# Patient Record
Sex: MALE | Race: WHITE | Employment: OTHER | URBAN - METROPOLITAN AREA
[De-identification: names, ages, dates, MRNs, and addresses within clinical notes are randomized per-mention and may not be internally consistent; named-entity substitution may affect disease eponyms.]

---

## 2017-02-08 ENCOUNTER — MEDICAL CORRESPONDENCE (OUTPATIENT)
Dept: HEALTH INFORMATION MANAGEMENT | Facility: CLINIC | Age: 67
End: 2017-02-08

## 2017-02-20 ENCOUNTER — OFFICE VISIT (OUTPATIENT)
Dept: PHYSICAL MEDICINE AND REHAB | Facility: CLINIC | Age: 67
End: 2017-02-20

## 2017-02-20 VITALS
SYSTOLIC BLOOD PRESSURE: 152 MMHG | OXYGEN SATURATION: 96 % | TEMPERATURE: 97.3 F | DIASTOLIC BLOOD PRESSURE: 96 MMHG | HEIGHT: 69 IN | WEIGHT: 229 LBS | BODY MASS INDEX: 33.92 KG/M2 | HEART RATE: 61 BPM | RESPIRATION RATE: 20 BRPM

## 2017-02-20 DIAGNOSIS — I63.89 CEREBROVASCULAR ACCIDENT (CVA) DUE TO OTHER MECHANISM (H): Primary | ICD-10-CM

## 2017-02-20 RX ORDER — CITALOPRAM HYDROBROMIDE 40 MG/1
40 TABLET ORAL EVERY MORNING
COMMUNITY
Start: 2016-12-02

## 2017-02-20 RX ORDER — ACETAMINOPHEN 325 MG/1
2 TABLET ORAL PRN
COMMUNITY

## 2017-02-20 RX ORDER — BUPROPION HYDROCHLORIDE 150 MG/1
150 TABLET, EXTENDED RELEASE ORAL 2 TIMES DAILY
COMMUNITY
Start: 2016-08-02

## 2017-02-20 RX ORDER — HYDROCHLOROTHIAZIDE 25 MG/1
25 TABLET ORAL EVERY MORNING
COMMUNITY
Start: 2016-03-02

## 2017-02-20 RX ORDER — FOLIC ACID 1 MG/1
1 TABLET ORAL EVERY MORNING
COMMUNITY
Start: 2011-11-03

## 2017-02-20 RX ORDER — GABAPENTIN 600 MG/1
2 TABLET ORAL 2 TIMES DAILY
COMMUNITY
Start: 2016-03-02

## 2017-02-20 RX ORDER — METOPROLOL SUCCINATE 25 MG/1
25 TABLET, EXTENDED RELEASE ORAL 2 TIMES DAILY
COMMUNITY
Start: 2016-03-02

## 2017-02-20 RX ORDER — AMLODIPINE BESYLATE 10 MG/1
10 TABLET ORAL EVERY MORNING
COMMUNITY
Start: 2016-03-02

## 2017-02-20 RX ORDER — SIMVASTATIN 20 MG
20 TABLET ORAL EVERY MORNING
COMMUNITY
Start: 2016-03-02

## 2017-02-20 RX ORDER — LISINOPRIL 40 MG/1
40 TABLET ORAL EVERY MORNING
COMMUNITY
Start: 2016-03-02

## 2017-02-20 ASSESSMENT — PAIN SCALES - GENERAL: PAINLEVEL: NO PAIN (0)

## 2017-02-20 NOTE — LETTER
2/20/2017       RE: Asa Keith  [address]  [facility] [address]     Dear Colleague,    Thank you for referring your patient, Asa Keith, to the [facility] PHYSICAL MEDICINE AND REHABILITATION at [facility]. Please see a copy of my visit note below.    First of 10 rTMS visits.  66-year-old male with left hemiplegia following stroke in 2011. Lacunar infarct of posterior lentiform nucleus with extension into corona radiata on right.  Cognition and speech intact.  Shows about 60 degrees of active finger flexion and extension.  Limited by weakness and spasticity, not contracture.  Spasticity rates 2 on Lolis scale for left finger flexors and wrist flexors.  He roblero goals to play golf better and to play his guitar again.  Baseline symptoms unremarkable except for too little sleep at times.  His BDI score was 7.  His Box and block score was 15 on left and 54 on right.  His RMT was 90 on left for extensor digitorum. Did not attempt to get average peak to peak amplitude as MEPS were only occasional.  On right RMT was 65.  He received 10 minutes of 6-hz priming rTMS followed by 10 minutes of 1-Hz principal rTMS to left hemisphere at intensity of 59.  Followed by hand exercises and home instruction.  Also assisted him with use of portable estim unit for finger extensors that he owns and will use at home.  All tolerated well.    Asa Soares, PT

## 2017-02-20 NOTE — NURSING NOTE
Chief Complaint   Patient presents with     Consult     [facility]- NEW- [facility]

## 2017-02-20 NOTE — MR AVS SNAPSHOT
MRN:[id number]                      After Visit Summary   2/20/2017    Asa Keith    MRN: [id number]           Visit Information        Provider Department      2/20/2017 4:00 PM Asa Saores PT [facility] Physical Medicine and Rehabilitation        Your next 10 appointments already scheduled     Feb 23, 2017  3:30 PM CST   (Arrive by 3:15 PM)   Return Visit with TAPAN Alejandre [facility] Physical Medicine and Rehabilitation ([facility])    [address] [place] [address]   [phone]            Feb 24, 2017  3:30 PM CST   (Arrive by 3:15 PM)   Return Visit with TPAAN Alejandre [facility] Physical Medicine and Rehabilitation ([facility])    [address] [place] [address]   [phone]            Feb 27, 2017  3:30 PM CST   (Arrive by 3:15 PM)   Return Visit with TAPAN Alejandre [facility] Physical Medicine and Rehabilitation ([facility])    [address] [place] [address]   [phone]            Feb 28, 2017  3:30 PM CST   (Arrive by 3:15 PM)   Return Visit with TAPAN Alejandre [facility] Physical Medicine and Rehabilitation ([facility])    [address] [place] [address]   [phone]            Mar 01, 2017 12:30 PM CST   (Arrive by 12:15 PM)   Return Visit with TAPAN Alejandre [facility] Physical Medicine and Rehabilitation ([facility])    [address] [place] [address]   [phone]            Mar 02, 2017  3:30 PM CST   (Arrive by 3:15 PM)   [facility] ([facility])    [address] [place] [address]   [phone]            Mar 03, 2017  3:30 PM CST   (Arrive by 3:15 PM)   Return Visit with  Asa Soares, PT   [facility] Physical Medicine and Rehabilitation ([facility])    [address] [address] [phone]              MyChart Information     HDS INTERNATIONALhart is an electronic gateway that provides easy, online access to your medical records. With HDS INTERNATIONALhart, you can request a clinic appointment, read your test results, renew a prescription or communicate with your care team.     To sign up for Taggablet visit the website at [email or web address]   You will be asked to enter the access code listed below, as well as some personal information. Please follow the directions to create your username and password.     Your access code is: [email or web address]  Expires: 2017  7:51 PM     Your access code will  in 90 days. If you need help or a new code, please contact your [facility] or call [phone] for assistance.        Care EveryWhere ID     This is your Care EveryWhere ID. This could be used by other organizations to access your [place] medical records  [id number]

## 2017-02-21 ENCOUNTER — OFFICE VISIT (OUTPATIENT)
Dept: PHYSICAL MEDICINE AND REHAB | Facility: CLINIC | Age: 67
End: 2017-02-21

## 2017-02-21 VITALS
RESPIRATION RATE: 20 BRPM | WEIGHT: 229 LBS | HEIGHT: 69 IN | DIASTOLIC BLOOD PRESSURE: 90 MMHG | SYSTOLIC BLOOD PRESSURE: 148 MMHG | BODY MASS INDEX: 33.92 KG/M2 | HEART RATE: 61 BPM | OXYGEN SATURATION: 96 %

## 2017-02-21 DIAGNOSIS — I63.89 CEREBROVASCULAR ACCIDENT (CVA) DUE TO OTHER MECHANISM (H): Primary | ICD-10-CM

## 2017-02-21 ASSESSMENT — PAIN SCALES - GENERAL: PAINLEVEL: NO PAIN (0)

## 2017-02-21 NOTE — PROGRESS NOTES
rTMS visit 2.  No new symptoms.  AMT for contralesional M1=80.  Could not get a RMT because he cannot relax his hand.  Treatment intensity=72.   6 hz priming for 10 min then 1 hz principal to contralesional M1.  Followed by various exercises.

## 2017-02-21 NOTE — MR AVS SNAPSHOT
MRN:[id number]                      After Visit Summary   2/21/2017    Asa Keith    MRN: [id number]           Visit Information        Provider Department      2/21/2017 3:30 PM Asa Soares PT [facility] Physical Medicine and Rehabilitation        Your next 10 appointments already scheduled     Feb 22, 2017 12:30 PM CST   (Arrive by 12:15 PM)   Return Visit with TAPAN Alejandre [facility] Physical Medicine and Rehabilitation ([facility])    [address] [place] [address]   [phone]            Feb 23, 2017  3:30 PM CST   (Arrive by 3:15 PM)   Return Visit with TAPAN Alejandre [facility] Physical Medicine and Rehabilitation ([facility])    [address] [place] [address]   [phone]            Feb 24, 2017  3:30 PM CST   (Arrive by 3:15 PM)   Return Visit with TAPAN Alejandre [facility] Physical Medicine and Rehabilitation ([facility])    [address] [place] [address]   [phone]            Feb 27, 2017  3:30 PM CST   (Arrive by 3:15 PM)   Return Visit with TAPAN Alejandre [facility] Physical Medicine and Rehabilitation ([facility])    [address] [place] [address]   [phone]            Feb 28, 2017  3:30 PM CST   (Arrive by 3:15 PM)   Return Visit with TAPAN Alejandre [facility] Physical Medicine and Rehabilitation ([facility])    [address] [place] [address]   [phone]            Mar 01, 2017 12:30 PM CST   (Arrive by 12:15 PM)   [facility] ([facility])    [address] [place] [address]   [phone]            Mar 02, 2017  3:30 PM CST   (Arrive by 3:15 PM)   Return Visit with  Asa Soares PT   [facility] Physical Medicine and Rehabilitation ([facility])    [address] [place] [address] [address]   [phone]            Mar 03, 2017  3:30 PM CST   (Arrive by 3:15 PM)   Return Visit with Asa Soares PT   [facility] Physical Medicine and Rehabilitation ([facility])    [address] [place] [address] [address]   [phone]              MyChart Information     Snapjoy is an electronic gateway that provides easy, online access to your medical records. With Snapjoy, you can request a clinic appointment, read your test results, renew a prescription or communicate with your care team.     To sign up for Snapjoy visit the website at [email or web address]   You will be asked to enter the access code listed below, as well as some personal information. Please follow the directions to create your username and password.     Your access code is: [email or web address]  Expires: 2017  7:51 PM     Your access code will  in 90 days. If you need help or a new code, please contact your [facility] or call [phone] for assistance.        Care EveryWhere ID     This is your Care EveryWhere ID. This could be used by other organizations to access your [place] medical records  [id number]

## 2017-02-21 NOTE — LETTER
2/21/2017       RE: Asa Keith  [address]  [facility] [address]     Dear Colleague,    Thank you for referring your patient, Asa Keith, to the [facility] PHYSICAL MEDICINE AND REHABILITATION at [facility]. Please see a copy of my visit note below.    rTMS visit 2.  No new symptoms.  AMT for contralesional M1=80.  Could not get a RMT because he cannot relax his hand.  Treatment intensity=72.   6 hz priming for 10 min then 1 hz principal to contralesional M1.  Followed by various exercises.     Again, thank you for allowing me to participate in the care of your patient.      Sincerely,    Asa Soares, PT

## 2017-02-22 ENCOUNTER — OFFICE VISIT (OUTPATIENT)
Dept: PHYSICAL MEDICINE AND REHAB | Facility: CLINIC | Age: 67
End: 2017-02-22

## 2017-02-22 VITALS
OXYGEN SATURATION: 97 % | SYSTOLIC BLOOD PRESSURE: 138 MMHG | RESPIRATION RATE: 20 BRPM | HEART RATE: 64 BPM | DIASTOLIC BLOOD PRESSURE: 83 MMHG

## 2017-02-22 DIAGNOSIS — I63.89 CEREBROVASCULAR ACCIDENT (CVA) DUE TO OTHER MECHANISM (H): Primary | ICD-10-CM

## 2017-02-22 ASSESSMENT — PAIN SCALES - GENERAL: PAINLEVEL: NO PAIN (0)

## 2017-02-22 NOTE — MR AVS SNAPSHOT
MRN:[id number]                      After Visit Summary   2/22/2017    Asa Keith    MRN: [id number]           Visit Information        Provider Department      2/22/2017 12:30 PM Asa Soares PT [facility] Physical Medicine and Rehabilitation        Your next 10 appointments already scheduled     Feb 23, 2017  3:30 PM CST   (Arrive by 3:15 PM)   Return Visit with TAPAN Alejandre [facility] Physical Medicine and Rehabilitation ([facility])    [address] [place] [address]   [phone]            Feb 24, 2017  3:30 PM CST   (Arrive by 3:15 PM)   Return Visit with TAPAN Alejandre [facility] Physical Medicine and Rehabilitation ([facility])    [address] [place] [address]   [phone]            Feb 27, 2017  3:30 PM CST   (Arrive by 3:15 PM)   Return Visit with TAPAN Alejandre [facility] Physical Medicine and Rehabilitation ([facility])    [address] [place] [address]   [phone]            Feb 28, 2017  3:30 PM CST   (Arrive by 3:15 PM)   Return Visit with TAPAN Alejandre [facility] Physical Medicine and Rehabilitation ([facility])    [address] [place] [address]   [phone]            Mar 01, 2017 12:30 PM CST   (Arrive by 12:15 PM)   Return Visit with TAPAN Alejandre [facility] Physical Medicine and Rehabilitation ([facility])    [address] [place] [address]   [phone]            Mar 02, 2017  3:30 PM CST   (Arrive by 3:15 PM)   [facility] ([facility])    [address] [place] [address]   [phone]            Mar 03, 2017  3:30 PM CST   (Arrive by 3:15 PM)   Return Visit with  Asa Soares, PT   [facility] Physical Medicine and Rehabilitation ([facility])    [address] [address] [phone]              MyChart Information     hubbuzz.comhart is an electronic gateway that provides easy, online access to your medical records. With hubbuzz.comhart, you can request a clinic appointment, read your test results, renew a prescription or communicate with your care team.     To sign up for Loomiot visit the website at [email or web address]   You will be asked to enter the access code listed below, as well as some personal information. Please follow the directions to create your username and password.     Your access code is: [email or web address]  Expires: 2017  7:51 PM     Your access code will  in 90 days. If you need help or a new code, please contact your [facility] or call [phone] for assistance.        Care EveryWhere ID     This is your Care EveryWhere ID. This could be used by other organizations to access your [place] medical records  [id number]

## 2017-02-22 NOTE — LETTER
2/22/2017       RE: Asa Keith  [address]  [facility] [address]     Dear Colleague,    Thank you for referring your patient, Asa Keith, to the [facility] PHYSICAL MEDICINE AND REHABIL[facility]ATION at [facility]. Please see a copy of my visit note below.    Visit 3.  No new symptoms.  AMT more easily determined today but MEP still obscure amidst the interfering active EMG.  AMT = 55.  Treatment intensity = 50.  6 Hz priming to contralesional M1, then 1 Hz principal.   Followed by guitar practice (not reasonable), stacking blocks, keyboarding and other finger ex.    Asa Soares, PT    Again, thank you for allowing me to participate in the care of your patient.      Sincerely,    Asa Soares, PT

## 2017-02-22 NOTE — PROGRESS NOTES
Visit 3.  No new symptoms.  AMT more easily determined today but MEP still obscure amidst the interfering active EMG.  AMT = 55.  Treatment intensity = 50.  6 Hz priming to contralesional M1, then 1 Hz principal.   Followed by guitar practice (not reasonable), stacking blocks, keyboarding and other finger ex.    Asa Soares, PT

## 2017-02-23 ENCOUNTER — OFFICE VISIT (OUTPATIENT)
Dept: PHYSICAL MEDICINE AND REHAB | Facility: CLINIC | Age: 67
End: 2017-02-23

## 2017-02-23 VITALS — HEIGHT: 69 IN | DIASTOLIC BLOOD PRESSURE: 87 MMHG | HEART RATE: 62 BPM | SYSTOLIC BLOOD PRESSURE: 159 MMHG

## 2017-02-23 DIAGNOSIS — I63.89 CEREBROVASCULAR ACCIDENT (CVA) DUE TO OTHER MECHANISM (H): Primary | ICD-10-CM

## 2017-02-23 ASSESSMENT — PAIN SCALES - GENERAL: PAINLEVEL: NO PAIN (0)

## 2017-02-23 NOTE — LETTER
2/23/2017       RE: Asa Keith  [address]  [facility] [address]     Dear Colleague,    Thank you for referring your patient, Asa Keith, to the [facility] PHYSICAL MEDICINE AND REHABILITATION at [facility]. Please see a copy of my visit note below.    Visit 4.  No adverse symptoms.  AMT= 58.  Treatment intensity=52.  6 Hz priming to contralesional M1 then 1 Hz principal.  Followed by Hand exercises.  Showing improved dexterity on stacking blocks and finger tracking.  Trying to shuffle cards but very difficult.  Overall, making progress.    Asa Soares PT

## 2017-02-23 NOTE — MR AVS SNAPSHOT
MRN:[id number]                      After Visit Summary   2/23/2017    Asa Keith    MRN: [id number]           Visit Information        Provider Department      2/23/2017 3:30 PM Asa Soares PT [facility] Physical Medicine and Rehabilitation        Your next 10 appointments already scheduled     Feb 24, 2017  3:30 PM CST   (Arrive by 3:15 PM)   Return Visit with TAPAN Alejandre [facility] Physical Medicine and Rehabilitation ([facility])    [address] [place] [address]   [phone]            Feb 27, 2017  3:30 PM CST   (Arrive by 3:15 PM)   [facility] ([facility])    [address] [place] [address]   [phone]            Feb 28, 2017  3:30 PM CST   (Arrive by 3:15 PM)   Return Visit with TAPAN Alejandre [facility] Physical Medicine and Rehabilitation ([facility])    [address] [place] [address]   [phone]            Mar 01, 2017 12:30 PM CST   (Arrive by 12:15 PM)   Return Visit with TAPAN Alejandre [facility] Physical Medicine and Rehabilitation ([facility])    [address] [place] [address]   [phone]            Mar 02, 2017  3:30 PM CST   (Arrive by 3:15 PM)   Return Visit with TAPAN Alejandre [facility] Physical Medicine and Rehabilitation ([facility])    [address] [place] [address]   [phone]            Mar 03, 2017  3:30 PM CST   (Arrive by 3:15 PM)   [facility] ([facility])    [address] [place] [address]   [phone]              MyChart Information     Blue Bay Technologieshart is an electronic gateway that  provides easy, online access to your medical records. With JK-Group, you can request a clinic appointment, read your test results, renew a prescription or communicate with your care team.     To sign up for JK-Group visit the website at [email or web address]   You will be asked to enter the access code listed below, as well as some personal information. Please follow the directions to create your username and password.     Your access code is: [email or web address]  Expires: 2017  7:51 PM     Your access code will  in 90 days. If you need help or a new code, please contact your [facility] or call [phone] for assistance.        Care EveryWhere ID     This is your Care EveryWhere ID. This could be used by other organizations to access your [place] medical records  [id number]

## 2017-02-23 NOTE — PROGRESS NOTES
Visit 4.  No adverse symptoms.  AMT= 58.  Treatment intensity=52.  6 Hz priming to contralesional M1 then 1 Hz principal.  Followed by Hand exercises.  Showing improved dexterity on stacking blocks and finger tracking.  Trying to shuffle cards but very difficult.  Overall, making progress.    Asa Soares PT

## 2017-02-24 ENCOUNTER — OFFICE VISIT (OUTPATIENT)
Dept: PHYSICAL MEDICINE AND REHAB | Facility: CLINIC | Age: 67
End: 2017-02-24

## 2017-02-24 VITALS
DIASTOLIC BLOOD PRESSURE: 85 MMHG | WEIGHT: 230.4 LBS | HEART RATE: 60 BPM | SYSTOLIC BLOOD PRESSURE: 155 MMHG | BODY MASS INDEX: 34.13 KG/M2 | HEIGHT: 69 IN

## 2017-02-24 DIAGNOSIS — I63.89 CEREBROVASCULAR ACCIDENT (CVA) DUE TO OTHER MECHANISM (H): Primary | ICD-10-CM

## 2017-02-24 ASSESSMENT — PAIN SCALES - GENERAL: PAINLEVEL: NO PAIN (0)

## 2017-02-24 NOTE — MR AVS SNAPSHOT
After Visit Summary   2/24/2017    Asa Keith    MRN: [id number]           Patient Information     Date Of Birth          1950        Visit Information        Provider Department      2/24/2017 3:30 PM Asa Soares PT [facility] Physical Medicine and Rehabilitation        Today's Diagnoses     Cerebrovascular accident (CVA) due to other mechanism (H)    -  1       Follow-ups after your visit        Your next 10 appointments already scheduled     Feb 27, 2017  3:30 PM CST   (Arrive by 3:15 PM)   Return Visit with TAPAN Alejandre [facility] Physical Medicine and Rehabilitation ([facility])    [address] [address]   [phone]            Feb 28, 2017  3:30 PM CST   (Arrive by 3:15 PM)   Return Visit with TAPAN Alejandre [facility] Physical Medicine and Rehabilitation ([facility])    [address] [place] [address] [phone]            Mar 01, 2017 12:30 PM CST   (Arrive by 12:15 PM)   Return Visit with TAPAN Alejandre [facility] Physical Medicine and Rehabilitation ([facility])    [address] [place] [address] [phone]            Mar 02, 2017  3:30 PM CST   (Arrive by 3:15 PM)   Return Visit with TAPAN Alejandre [facility] Physical Medicine and Rehabilitation ([facility])    [address] [address] [phone]            Mar 03, 2017  3:30 PM CST   (Arrive by 3:15 PM)   Return Visit with TAPAN Alejandre [facility] Physical Medicine and Rehabilitation ([facility])    [address] [place] [address] [phone]              Who to contact     Please call your clinic at [phone] to:    Ask questions about your health    Make or cancel appointments    Discuss your medicines    Learn about your  "test results    Speak to your doctor   If you have compliments or concerns about an experience at your clinic, or if you wish to file a complaint, please contact [facility] Patient Relations at [phone] or email us at [email or web address]         Additional Information About Your Visit        Salsa LabsharFishin' Glue Information     Pirate3Dt is an electronic gateway that provides easy, online access to your medical records. With School of Everything, you can request a clinic appointment, read your test results, renew a prescription or communicate with your care team.     To sign up for School of Everything visit the website at [email or web address]   You will be asked to enter the access code listed below, as well as some personal information. Please follow the directions to create your username and password.     Your access code is: [email or web address]  Expires: 2017  7:51 PM     Your access code will  in 90 days. If you need help or a new code, please contact your [facility] or call [phone] for assistance.        Care EveryWhere ID     This is your Care EveryWhere ID. This could be used by other organizations to access your [place] medical records  [id number]        Your Vitals Were     Pulse Height BMI (Body Mass Index)             60 1.753 m (5' 9\") 34.02 kg/m2          Blood Pressure from Last 3 Encounters:   17 155/85   17 159/87   17 138/83    Weight from Last 3 Encounters:   17 104.5 kg (230 lb 6.4 oz)   17 103.9 kg (229 lb)   17 103.9 kg (229 lb)              Today, you had the following     No orders found for display       Primary Care Provider Office Phone # Fax #    Luke Macias [phone]       [place] [address] [facility] [address]        Thank you!     Thank you for choosing [facility] PHYSICAL MEDICINE AND REHABILITATION  for your care. Our goal is always to provide you " with excellent care. Hearing back from our patients is one way we can continue to improve our services. Please take a few minutes to complete the written survey that you may receive in the mail after your visit with us. Thank you!             Your Updated Medication List - Protect others around you: Learn how to safely use, store and throw away your medicines at www.disposemymeds.org.          This list is accurate as of: 2/24/17  5:24 PM.  Always use your most recent med list.                   Brand Name Dispense Instructions for use    acetaminophen 325 MG tablet    TYLENOL     Take 2 tablets by mouth as needed       amLODIPine 10 MG tablet    NORVASC     Take 10 mg by mouth daily       aspirin 81 MG tablet      Take 81 mg by mouth daily       buPROPion 150 MG 12 hr tablet    WELLBUTRIN SR     Take 150 mg by mouth 2 times daily       citalopram 40 MG tablet    celeXA     Take 40 mg by mouth daily       folic acid 1 MG tablet    FOLVITE     Take 1 mg by mouth daily       gabapentin 600 MG tablet    NEURONTIN     Take 2 tablets by mouth daily       hydrochlorothiazide 25 MG tablet    HYDRODIURIL     Take 25 mg by mouth daily       lisinopril 40 MG tablet    PRINIVIL/ZESTRIL     Take 40 mg by mouth daily       metoprolol 25 MG 24 hr tablet    TOPROL-XL     Take 25 mg by mouth 2 times daily       RA VITAMIN B-12 TR 1000 MCG Tbcr   Generic drug:  cyanocobalamin      Take 1,000 mcg by mouth daily       simvastatin 20 MG tablet    ZOCOR     Take 20 mg by mouth daily

## 2017-02-24 NOTE — LETTER
2/24/2017       RE: Asa Keith  [address]  [facility] [address]     Dear Colleague,    Thank you for referring your patient, Asa Keith, to the [facility] PHYSICAL MEDICINE AND REHABILITATION at [facility]. Please see a copy of my visit note below.    rTMS visit 5.  No adverse events.  AMT=58.  Treatment intensity=52.  6Hz priming then 1 hz principal to contralesional M1.  Followed by box and block retest.  Left hand improved from 15 at baseline to 21 today.  Right hand remained stable with 54 at baseline and 56 today.  Practiced his hobby of using stamp collection adjustment using tweezers with left hand.  He was able to do this slowly.  Good exercise. Practiced with Smart Glove and did well.    Again, thank you for allowing me to participate in the care of your patient.      Sincerely,    Asa Soares, PT

## 2017-02-27 ENCOUNTER — OFFICE VISIT (OUTPATIENT)
Dept: PHYSICAL MEDICINE AND REHAB | Facility: CLINIC | Age: 67
End: 2017-02-27

## 2017-02-27 VITALS
BODY MASS INDEX: 32.93 KG/M2 | HEIGHT: 70 IN | SYSTOLIC BLOOD PRESSURE: 147 MMHG | DIASTOLIC BLOOD PRESSURE: 92 MMHG | HEART RATE: 59 BPM | WEIGHT: 230 LBS

## 2017-02-27 DIAGNOSIS — I63.89 CEREBROVASCULAR ACCIDENT (CVA) DUE TO OTHER MECHANISM (H): Primary | ICD-10-CM

## 2017-02-27 ASSESSMENT — PAIN SCALES - GENERAL: PAINLEVEL: NO PAIN (0)

## 2017-02-27 NOTE — LETTER
2/27/2017       RE: Asa Keith  [address]  [facility] [address]     Dear Colleague,    Thank you for referring your patient, Asa Keith, to the [facility] PHYSICAL MEDICINE AND REHABILITATION at [facility]. Please see a copy of my visit note below.    Visit #6.  No adverse events.  Tried to elicit an MEP from the ipsilesional hemisphere but no response was observed.  Returned to the contralesional hemisphere and found RMT at 60.  Treatment intensity was 54.  Applied 6 Hz priming then 1 Hz principal as before to contralesional M1.  Followed with hand exercises and functional tasks, including combing hair with left hand.  He is picking up small objects (paper clips, coins) effectively with his index and middle fingers plus thumb but no as skilled with ring or little fingers.  Still, he is making progress.    Again, thank you for allowing me to participate in the care of your patient.      Sincerely,    Asa Soares, PT

## 2017-02-27 NOTE — MR AVS SNAPSHOT
After Visit Summary   2/27/2017    Asa Keith    MRN: [id number]           Patient Information     Date Of Birth          1950        Visit Information        Provider Department      2/27/2017 3:30 PM Asa Soares PT M [facility] Physical Medicine and Rehabilitation        Today's Diagnoses     Cerebrovascular accident (CVA) due to other mechanism (H)    -  1       Follow-ups after your visit        Your next 10 appointments already scheduled     Feb 28, 2017  3:30 PM CST   (Arrive by 3:15 PM)   Return Visit with TAPAN Alejandre [facility] Physical Medicine and Rehabilitation ([facility])    [address] [place] [id number]   [phone]            Mar 01, 2017 12:30 PM CST   (Arrive by 12:15 PM)   Return Visit with TAPAN Alejandre [facility] Physical Medicine and Rehabilitation ([facility])    [address] [place] [id number] [phone]            Mar 02, 2017  3:30 PM CST   (Arrive by 3:15 PM)   Return Visit with TAPAN Alejandre [facility] Physical Medicine and Rehabilitation ([facility])    [address] [place] [address] [phone]            Mar 03, 2017  3:30 PM CST   (Arrive by 3:15 PM)   Return Visit with TAPAN Alejandre [facility] Physical Medicine and Rehabilitation ([facility])    [address] [place] [id number] [phone]              Who to contact     Please call your clinic at [phone] to:    Ask questions about your health    Make or cancel appointments    Discuss your medicines    Learn about your test results    Speak to your doctor   If you have compliments or concerns about an experience at your clinic, or if you wish to file a complaint, please contact [facility] Patient Relations at [phone] or email us at  "[email or web address]         Additional Information About Your Visit        [employer] Mogul Information     SmashChart is an electronic gateway that provides easy, online access to your medical records. With SmashChart, you can request a clinic appointment, read your test results, renew a prescription or communicate with your care team.     To sign up for SmashChart visit the website at [email or web address]   You will be asked to enter the access code listed below, as well as some personal information. Please follow the directions to create your username and password.     Your access code is: [email or web address]  Expires: 2017  7:51 PM     Your access code will  in 90 days. If you need help or a new code, please contact your [facility] or call [phone] for assistance.        Care EveryWhere ID     This is your Care EveryWhere ID. This could be used by other organizations to access your [place] medical records  [id number]        Your Vitals Were     Pulse Height BMI (Body Mass Index)             59 1.778 m (5' 10\") 33 kg/m2          Blood Pressure from Last 3 Encounters:   17 (!) 147/92   17 155/85   17 159/87    Weight from Last 3 Encounters:   17 104.3 kg (230 lb)   17 104.5 kg (230 lb 6.4 oz)   17 103.9 kg (229 lb)              Today, you had the following     No orders found for display       Primary Care Provider Office Phone # Fax #    Luke Macias [phone]       [facility] [address] [facility] [address]        Thank you!     Thank you for choosing [facility] PHYSICAL MEDICINE AND REHABILITATION  for your care. Our goal is always to provide you with excellent care. Hearing back from our patients is one way we can continue to improve our services. Please take a few minutes to complete the written survey that you may receive in the mail after your visit with us. Thank you!           "   Your Updated Medication List - Protect others around you: Learn how to safely use, store and throw away your medicines at www.disposemymeds.org.          This list is accurate as of: 2/27/17  6:09 PM.  Always use your most recent med list.                   Brand Name Dispense Instructions for use    acetaminophen 325 MG tablet    TYLENOL     Take 2 tablets by mouth as needed       amLODIPine 10 MG tablet    NORVASC     Take 10 mg by mouth daily       aspirin 81 MG tablet      Take 81 mg by mouth daily       buPROPion 150 MG 12 hr tablet    WELLBUTRIN SR     Take 150 mg by mouth 2 times daily       citalopram 40 MG tablet    celeXA     Take 40 mg by mouth daily       folic acid 1 MG tablet    FOLVITE     Take 1 mg by mouth daily       gabapentin 600 MG tablet    NEURONTIN     Take 2 tablets by mouth daily       hydrochlorothiazide 25 MG tablet    HYDRODIURIL     Take 25 mg by mouth daily       lisinopril 40 MG tablet    PRINIVIL/ZESTRIL     Take 40 mg by mouth daily       metoprolol 25 MG 24 hr tablet    TOPROL-XL     Take 25 mg by mouth 2 times daily       RA VITAMIN B-12 TR 1000 MCG Tbcr   Generic drug:  cyanocobalamin      Take 1,000 mcg by mouth daily       simvastatin 20 MG tablet    ZOCOR     Take 20 mg by mouth daily

## 2017-02-27 NOTE — NURSING NOTE
Chief Complaint   Patient presents with     RECHECK     TMS       Yu Sorenson LPN  Neuroscience- Movement Disorders and Epilepsy

## 2017-02-28 ENCOUNTER — OFFICE VISIT (OUTPATIENT)
Dept: PHYSICAL MEDICINE AND REHAB | Facility: CLINIC | Age: 67
End: 2017-02-28

## 2017-02-28 DIAGNOSIS — I63.89 CEREBROVASCULAR ACCIDENT (CVA) DUE TO OTHER MECHANISM (H): Primary | ICD-10-CM

## 2017-02-28 NOTE — MR AVS SNAPSHOT
After Visit Summary   2/28/2017    Asa Keith    MRN: [id number]           Patient Information     Date Of Birth          1950        Visit Information        Provider Department      2/28/2017 3:30 PM Asa Soares PT M [facility] Physical Medicine and Rehabilitation        Today's Diagnoses     Cerebrovascular accident (CVA) due to other mechanism (H)    -  1       Follow-ups after your visit        Your next 10 appointments already scheduled     Mar 01, 2017 12:30 PM CST   (Arrive by 12:15 PM)   Return Visit with TAPAN Alejandre [facility] Physical Medicine and Rehabilitation ([facility])    [address] [place] [address] [phone]            Mar 02, 2017  3:30 PM CST   (Arrive by 3:15 PM)   Return Visit with TAPAN Alejandre [facility] Physical Medicine and Rehabilitation ([facility])    [address] [place] [id number] [phone]            Mar 03, 2017  3:30 PM CST   (Arrive by 3:15 PM)   Return Visit with TAPAN Alejandre [facility] Physical Medicine and Rehabilitation ([facility])    [address] [place] [id number] [phone]              Who to contact     Please call your clinic at [phone] to:    Ask questions about your health    Make or cancel appointments    Discuss your medicines    Learn about your test results    Speak to your doctor   If you have compliments or concerns about an experience at your clinic, or if you wish to file a complaint, please contact [facility] Patient Relations at [phone] or email us at [email or web address]         Additional Information About Your Visit        SportsBUZZhart Information     Printi is an electronic gateway that provides easy, online access to your medical records. With Printi, you can request a clinic appointment, read your test  results, renew a prescription or communicate with your care team.     To sign up for CrowdPlathart visit the website at [email or web address]   You will be asked to enter the access code listed below, as well as some personal information. Please follow the directions to create your username and password.     Your access code is: [email or web address]  Expires: 2017  7:51 PM     Your access code will  in 90 days. If you need help or a new code, please contact your [facility] or call [phone] for assistance.        Care EveryWhere ID     This is your Care EveryWhere ID. This could be used by other organizations to access your [place] medical records  [id number]         Blood Pressure from Last 3 Encounters:   17 (!) 147/92   17 155/85   17 159/87    Weight from Last 3 Encounters:   17 104.3 kg (230 lb)   17 104.5 kg (230 lb 6.4 oz)   17 103.9 kg (229 lb)              Today, you had the following     No orders found for display       Primary Care Provider Office Phone # Fax #    Luke Macias [phone]       [place] [address] [facility] [address]        Thank you!     Thank you for choosing [facility] PHYSICAL MEDICINE AND REHABILITATION  for your care. Our goal is always to provide you with excellent care. Hearing back from our patients is one way we can continue to improve our services. Please take a few minutes to complete the written survey that you may receive in the mail after your visit with us. Thank you!             Your Updated Medication List - Protect others around you: Learn how to safely use, store and throw away your medicines at www.disposemymeds.org.          This list is accurate as of: 17 11:59 PM.  Always use your most recent med list.                   Brand Name Dispense Instructions for use    acetaminophen 325 MG tablet    TYLENOL     Take 2 tablets by mouth as needed        amLODIPine 10 MG tablet    NORVASC     Take 10 mg by mouth daily       aspirin 81 MG tablet      Take 81 mg by mouth daily       buPROPion 150 MG 12 hr tablet    WELLBUTRIN SR     Take 150 mg by mouth 2 times daily       citalopram 40 MG tablet    celeXA     Take 40 mg by mouth daily       folic acid 1 MG tablet    FOLVITE     Take 1 mg by mouth daily       gabapentin 600 MG tablet    NEURONTIN     Take 2 tablets by mouth daily       hydrochlorothiazide 25 MG tablet    HYDRODIURIL     Take 25 mg by mouth daily       lisinopril 40 MG tablet    PRINIVIL/ZESTRIL     Take 40 mg by mouth daily       metoprolol 25 MG 24 hr tablet    TOPROL-XL     Take 25 mg by mouth 2 times daily       RA VITAMIN B-12 TR 1000 MCG Tbcr   Generic drug:  cyanocobalamin      Take 1,000 mcg by mouth daily       simvastatin 20 MG tablet    ZOCOR     Take 20 mg by mouth daily

## 2017-02-28 NOTE — LETTER
2/28/2017       RE: Asa Keith  [address]  [facility] [address]     Dear Colleague,    Thank you for referring your patient, Asa Keith, to the [facility] PHYSICAL MEDICINE AND REHABILITATION at [facility]. Please see a copy of my visit note below.    Visit 7.  No adverse events.  Was able to elicit an inconsistent MEP from ipsilesional M1 today at extensor digitorum muscle by going to 100% of machine maximum.  Prior I only went to 90%.  This was more of an active motor threshold as, per usual, he cannot entirely relax his finger muscles.  Will use first dorsal interosseous as target muscle next time to see if a more consistent response can be elicited.  Treatment intensity was at 60% of machine max.  Received intermittent 6 hz rTMS to ipsilesional M1.  Followed by hand exercises.  Slow progress but noticeable.      Again, thank you for allowing me to participate in the care of your patient.      Sincerely,    Asa Soares, PT

## 2017-02-28 NOTE — PROGRESS NOTES
Visit #6.  No adverse events.  Tried to elicit an MEP from the ipsilesional hemisphere but no response was observed.  Returned to the contralesional hemisphere and found RMT at 60.  Treatment intensity was 54.  Applied 6 Hz priming then 1 Hz principal as before to contralesional M1.  Followed with hand exercises and functional tasks, including combing hair with left hand.  He is picking up small objects (paper clips, coins) effectively with his index and middle fingers plus thumb but no as skilled with ring or little fingers.  Still, he is making progress.

## 2017-03-01 ENCOUNTER — OFFICE VISIT (OUTPATIENT)
Dept: PHYSICAL MEDICINE AND REHAB | Facility: CLINIC | Age: 67
End: 2017-03-01

## 2017-03-01 VITALS — HEIGHT: 70 IN | HEART RATE: 61 BPM | DIASTOLIC BLOOD PRESSURE: 88 MMHG | SYSTOLIC BLOOD PRESSURE: 149 MMHG

## 2017-03-01 DIAGNOSIS — I63.89 CEREBROVASCULAR ACCIDENT (CVA) DUE TO OTHER MECHANISM (H): Primary | ICD-10-CM

## 2017-03-01 ASSESSMENT — PAIN SCALES - GENERAL: PAINLEVEL: NO PAIN (0)

## 2017-03-01 NOTE — MR AVS SNAPSHOT
After Visit Summary   3/1/2017    Asa Keith    MRN: [id number]           Patient Information     Date Of Birth          1950        Visit Information        Provider Department      3/1/2017 12:30 PM Asa Soares, PT [facility] Physical Medicine and Rehabilitation        Today's Diagnoses     Cerebrovascular accident (CVA) due to other mechanism (H)    -  1      Care Instructions    Home plan for Asa    Continue with following activities:  1) tying shoelaces  2) picking up small objects (paper clips, raisins, grapes, etc) and releasing them into a bowl  3) use left hand to eat with and manipulate objects such as fork, spoon, etc  4) place fingertips and thumb into a piece of bread or cookie dough then try to stretch them apart  5) turn pages of a book  6) try shooting a marble with thumb  7) pick credit cards and dollar bills from wallet and then return them  8) emphasize the ring and little finger more in many of the above activities  9) manipulate the TV remote with your left hand  10) continue sewing and knitting  11) comb hair with left hand  12) open bottle caps with left hand  13) drink fluids with left hand  14) peel the shell off of a hard-boiled egg or wilfredo with left hand  15) practice isolated thumb extension and flexion exercises  16) practice shoulder stretches  17) stretch fingers and wrist  18) try guitar again but don t frustrate yourself  19) continue golfing and enjoy it  20) be creative and decide other activities on your own, possibilities are endless    Be patient with yourself as using the left hand more will require more time but, in the end, continuing these activities will help you to retain what you have gained and perhaps help you to gain even more.  The mental challenge of figuring out how to do difficult tasks and then repeatedly attempting to do them is what stimulates the brain to change.  But at the same time, be reasonable so you do not frustrate  yourself.    Wishing you all the best,    Star Soares, PhD, PT ([email or web address])        Follow-ups after your visit        Your next 10 appointments already scheduled     Mar 02, 2017  3:30 PM CST   (Arrive by 3:15 PM)   Return Visit with Asa Soares PT   [facility] Physical Medicine and Rehabilitation ([facility])    [address] [facility] [address] [phone]            Mar 03, 2017  3:30 PM CST   (Arrive by 3:15 PM)   Return Visit with Asa Soares PT   [facility] Physical Medicine and Rehabilitation ([facility])    [address] [facility] [place] [address] [phone]              Who to contact     Please call your clinic at [phone] to:    Ask questions about your health    Make or cancel appointments    Discuss your medicines    Learn about your test results    Speak to your doctor   If you have compliments or concerns about an experience at your clinic, or if you wish to file a complaint, please contact [facility] Patient Relations at [phone] or email us at [email or web address]         Additional Information About Your Visit        MyChart Information     Sidecart is an electronic gateway that provides easy, online access to your medical records. With GraphOn, you can request a clinic appointment, read your test results, renew a prescription or communicate with your care team.     To sign up for Sidecart visit the website at [email or web address]   You will be asked to enter the access code listed below, as well as some personal information. Please follow the directions to create your username and password.     Your access code is: [email or web address]  Expires: 2017  7:51 PM     Your access code will  in 90 days. If you need help or a new code, please contact your [facility] or call [phone] for assistance.        Care  "EveryWhere ID     This is your Care EveryWhere ID. This could be used by other organizations to access your [place] medical records  [id number]        Your Vitals Were     Pulse Height                61 1.778 m (5' 10\")           Blood Pressure from Last 3 Encounters:   03/01/17 149/88   02/27/17 (!) 147/92   02/24/17 155/85    Weight from Last 3 Encounters:   02/27/17 104.3 kg (230 lb)   02/24/17 104.5 kg (230 lb 6.4 oz)   02/21/17 103.9 kg (229 lb)              Today, you had the following     No orders found for display       Primary Care Provider Office Phone # Fax #    Luke Macias [phone]       [facility] [address] [facility] [address]        Thank you!     Thank you for choosing [facility] PHYSICAL MEDICINE AND REHABILITATION  for your care. Our goal is always to provide you with excellent care. Hearing back from our patients is one way we can continue to improve our services. Please take a few minutes to complete the written survey that you may receive in the mail after your visit with us. Thank you!             Your Updated Medication List - Protect others around you: Learn how to safely use, store and throw away your medicines at www.disposemymeds.org.          This list is accurate as of: 3/1/17 11:59 PM.  Always use your most recent med list.                   Brand Name Dispense Instructions for use    acetaminophen 325 MG tablet    TYLENOL     Take 2 tablets by mouth as needed       amLODIPine 10 MG tablet    NORVASC     Take 10 mg by mouth daily       aspirin 81 MG tablet      Take 81 mg by mouth daily       buPROPion 150 MG 12 hr tablet    WELLBUTRIN SR     Take 150 mg by mouth 2 times daily       citalopram 40 MG tablet    celeXA     Take 40 mg by mouth daily       folic acid 1 MG tablet    FOLVITE     Take 1 mg by mouth daily       gabapentin 600 MG tablet    NEURONTIN     Take 2 tablets by mouth daily       hydrochlorothiazide 25 MG tablet    " HYDRODIURIL     Take 25 mg by mouth daily       lisinopril 40 MG tablet    PRINIVIL/ZESTRIL     Take 40 mg by mouth daily       metoprolol 25 MG 24 hr tablet    TOPROL-XL     Take 25 mg by mouth 2 times daily       RA VITAMIN B-12 TR 1000 MCG Tbcr   Generic drug:  cyanocobalamin      Take 1,000 mcg by mouth daily       simvastatin 20 MG tablet    ZOCOR     Take 20 mg by mouth daily

## 2017-03-01 NOTE — PROGRESS NOTES
Visit 7.  No adverse events.  Was able to elicit an inconsistent MEP from ipsilesional M1 today at extensor digitorum muscle by going to 100% of machine maximum.  Prior I only went to 90%.  This was more of an active motor threshold as, per usual, he cannot entirely relax his finger muscles.  Will use first dorsal interosseous as target muscle next time to see if a more consistent response can be elicited.  Treatment intensity was at 60% of machine max.  Received intermittent 6 hz rTMS to ipsilesional M1.  Followed by hand exercises.  Slow progress but noticeable.

## 2017-03-01 NOTE — LETTER
3/1/2017       RE: Asa Keith  [address]  [facility] [address]     Dear Colleague,    Thank you for referring your patient, Asa Keith, to the [facility] PHYSICAL MEDICINE AND REHABILITATION at [facility]. Please see a copy of my visit note below.    Visit 8.  No adverse events.  Applied 6 Hz rTMS intermittently to ipsilesional M1 at intensity of 65%.  Then varied hand exercises plus shoulder stretches.    Again, thank you for allowing me to participate in the care of your patient.      Sincerely,    Asa Soares, PT

## 2017-03-02 ENCOUNTER — OFFICE VISIT (OUTPATIENT)
Dept: PHYSICAL MEDICINE AND REHAB | Facility: CLINIC | Age: 67
End: 2017-03-02

## 2017-03-02 DIAGNOSIS — I63.89 CEREBROVASCULAR ACCIDENT (CVA) DUE TO OTHER MECHANISM (H): Primary | ICD-10-CM

## 2017-03-02 NOTE — MR AVS SNAPSHOT
After Visit Summary   3/2/2017    Asa Keith    MRN: [id number]           Patient Information     Date Of Birth          1950        Visit Information        Provider Department      3/2/2017 3:30 PM Asa Soares PT [facility]        Today's Diagnoses     Cerebrovascular accident (CVA) due to other mechanism (H)    -  1       Follow-ups after your visit        Your next 10 appointments already scheduled     Mar 03, 2017  3:30 PM CST   (Arrive by 3:15 PM)   Return Visit with TAPAN [facility] ([facility])    [address] [place] [address] [phone]              Who to contact     Please call your clinic at [phone] to:    Ask questions about your health    Make or cancel appointments    Discuss your medicines    Learn about your test results    Speak to your doctor   If you have compliments or concerns about an experience at your clinic, or if you wish to file a complaint, please contact [facility] Patient Relations at [phone] or email us at [email or web address]         Additional Information About Your Visit        MyChart Information     Di[employer] Kinetic[employer] is an electronic gateway that provides easy, online access to your medical records. With Thucy, you can request a clinic appointment, read your test results, renew a prescription or communicate with your care team.     To sign up for [employer]etic[employer] visit the website at [email or web address]   You will be asked to enter the access code listed below, as well as some personal information. Please follow the directions to create your username and password.     Your access code is: [email or web address]  Expires: 2017  7:51 PM     Your access code will  in 90 days. If you need help or a new code, please contact your [facility] or call  [phone] for assistance.        Care EveryWhere ID     This is your Care EveryWhere ID. This could be used by other organizations to access your [place] medical records  [id number]         Blood Pressure from Last 3 Encounters:   03/01/17 149/88   02/27/17 (!) 147/92   02/24/17 155/85    Weight from Last 3 Encounters:   02/27/17 104.3 kg (230 lb)   02/24/17 104.5 kg (230 lb 6.4 oz)   02/21/17 103.9 kg (229 lb)              Today, you had the following     No orders found for display       Primary Care Provider Office Phone # Fax #    Luke Macias [phone]       [place] [address] [facility] [address]        Thank you!     Thank you for choosing [facility] PHYSICAL MEDICINE AND REHABILITATION  for your care. Our goal is always to provide you with excellent care. Hearing back from our patients is one way we can continue to improve our services. Please take a few minutes to complete the written survey that you may receive in the mail after your visit with us. Thank you!             Your Updated Medication List - Protect others around you: Learn how to safely use, store and throw away your medicines at www.disposemymeds.org.          This list is accurate as of: 3/2/17  5:01 PM.  Always use your most recent med list.                   Brand Name Dispense Instructions for use    acetaminophen 325 MG tablet    TYLENOL     Take 2 tablets by mouth as needed       amLODIPine 10 MG tablet    NORVASC     Take 10 mg by mouth daily       aspirin 81 MG tablet      Take 81 mg by mouth daily       buPROPion 150 MG 12 hr tablet    WELLBUTRIN SR     Take 150 mg by mouth 2 times daily       citalopram 40 MG tablet    celeXA     Take 40 mg by mouth daily       folic acid 1 MG tablet    FOLVITE     Take 1 mg by mouth daily       gabapentin 600 MG tablet    NEURONTIN     Take 2 tablets by mouth daily       hydrochlorothiazide 25 MG tablet    HYDRODIURIL     Take 25 mg by mouth daily        lisinopril 40 MG tablet    PRINIVIL/ZESTRIL     Take 40 mg by mouth daily       metoprolol 25 MG 24 hr tablet    TOPROL-XL     Take 25 mg by mouth 2 times daily       RA VITAMIN B-12 TR 1000 MCG Tbcr   Generic drug:  cyanocobalamin      Take 1,000 mcg by mouth daily       simvastatin 20 MG tablet    ZOCOR     Take 20 mg by mouth daily

## 2017-03-02 NOTE — LETTER
3/2/2017       RE: Asa Keith  [address]  [facility] [address]     Dear Colleague,    Thank you for referring your patient, Asa Keith, to the [facility] PHYSICAL MEDICINE AND REHABILITATION at [facility]. Please see a copy of my visit note below.    Visit 9.  Patient reports mild pain in left upper trapezius.  Reports that it stems from his sleeping posture last night.  Applied 6 hz rTMS intermittent to ipsilesional M1.  Intensity at 65%.  Then instructed in trunk rotation exercises in chair to help mobility for golf game.  Practiced getting down on floor, crawling and getting up.  Difficult but doable for him.  Hand exercises as usual.      Again, thank you for allowing me to participate in the care of your patient.      Sincerely,    Asa Soares, PT

## 2017-03-02 NOTE — PROGRESS NOTES
Visit 8.  No adverse events.  Applied 6 Hz rTMS intermittently to ipsilesional M1 at intensity of 65%.  Then varied hand exercises plus shoulder stretches.

## 2017-03-02 NOTE — PROGRESS NOTES
Visit 9.  Patient reports mild pain in left upper trapezius.  Reports that it stems from his sleeping posture last night.  Applied 6 hz rTMS intermittent to ipsilesional M1.  Intensity at 65%.  Then instructed in trunk rotation exercises in chair to help mobility for golf game.  Practiced getting down on floor, crawling and getting up.  Difficult but doable for him.  Hand exercises as usual.

## 2017-03-02 NOTE — PATIENT INSTRUCTIONS
Home plan for Asa    Continue with following activities:  1) tying shoelaces  2) picking up small objects (paper clips, raisins, grapes, etc) and releasing them into a bowl  3) use left hand to eat with and manipulate objects such as fork, spoon, etc  4) place fingertips and thumb into a piece of bread or cookie dough then try to stretch them apart  5) turn pages of a book  6) try shooting a marble with thumb  7) pick credit cards and dollar bills from wallet and then return them  8) emphasize the ring and little finger more in many of the above activities  9) manipulate the TV remote with your left hand  10) continue sewing and knitting  11) comb hair with left hand  12) open bottle caps with left hand  13) drink fluids with left hand  14) peel the shell off of a hard-boiled egg or wilfredo with left hand  15) practice isolated thumb extension and flexion exercises  16) practice shoulder stretches  17) stretch fingers and wrist  18) try guitar again but don t frustrate yourself  19) continue golfing and enjoy it  20) be creative and decide other activities on your own, possibilities are endless    Be patient with yourself as using the left hand more will require more time but, in the end, continuing these activities will help you to retain what you have gained and perhaps help you to gain even more.  The mental challenge of figuring out how to do difficult tasks and then repeatedly attempting to do them is what stimulates the brain to change.  But at the same time, be reasonable so you do not frustrate yourself.    Wishing you all the best,    Star Soares, PhD, PT ([email or web address])

## 2017-03-03 ENCOUNTER — OFFICE VISIT (OUTPATIENT)
Dept: PHYSICAL MEDICINE AND REHAB | Facility: CLINIC | Age: 67
End: 2017-03-03

## 2017-03-03 VITALS
BODY MASS INDEX: 32.93 KG/M2 | HEIGHT: 70 IN | SYSTOLIC BLOOD PRESSURE: 146 MMHG | WEIGHT: 230 LBS | DIASTOLIC BLOOD PRESSURE: 90 MMHG | HEART RATE: 82 BPM

## 2017-03-03 DIAGNOSIS — I63.89 CEREBROVASCULAR ACCIDENT (CVA) DUE TO OTHER MECHANISM (H): Primary | ICD-10-CM

## 2017-03-03 NOTE — LETTER
3/3/2017       RE: Asa Keith  [address]  [facility] [address]     Dear Colleague,    Thank you for referring your patient, Asa Keith, to the [facility] PHYSICAL MEDICINE AND REHABILITATION at [facility]. Please see a copy of my visit note below.    Visit 10 (final).  No adverse events from treatment but reports kink in neck from sleeping wrong.  Has happened many times in past.  No RMT.  Was able to elicit MEP in ipsilesional M1 occasionally with intensity at 100%.  No posttest of excitability.  Received 6 hz intermittent rTMS to ipsilesional M1.  Box and block test score = 19 compared to 15 at baseline on left.  On right it was 53 compared to 54 at baseline.  His Global Rating of Change score was +2.  My score for him was +3.  Gave him home exercises.  Now discharged.    Again, thank you for allowing me to participate in the care of your patient.      Sincerely,    Asa Soares, PT

## 2017-03-03 NOTE — PROGRESS NOTES
Visit 10 (final).  No adverse events from treatment but reports kink in neck from sleeping wrong.  Has happened many times in past.  No RMT.  Was able to elicit MEP in ipsilesional M1 occasionally with intensity at 100%.  No posttest of excitability.  Received 6 hz intermittent rTMS to ipsilesional M1.  Box and block test score = 19 compared to 15 at baseline on left.  On right it was 53 compared to 54 at baseline.  His Global Rating of Change score was +2.  My score for him was +3.  Gave him home exercises.  Now discharged.

## 2017-03-03 NOTE — MR AVS SNAPSHOT
"              After Visit Summary   3/3/2017    Asa Keith    MRN: [id number]           Patient Information     Date Of Birth          1950        Visit Information        Provider Department      3/3/2017 3:30 PM Asa Soares [facility] Physical Medicine and Rehabilitation        Today's Diagnoses     Cerebrovascular accident (CVA) due to other mechanism (H)    -  1       Follow-ups after your visit        Who to contact     Please call your clinic at [phone] to:    Ask questions about your health    Make or cancel appointments    Discuss your medicines    Learn about your test results    Speak to your doctor   If you have compliments or concerns about an experience at your clinic, or if you wish to file a complaint, please contact [facility] Patient Relations at [phone] or email us at [email or web address]         Additional Information About Your Visit        MyChart Information     Yorxs is an electronic gateway that provides easy, online access to your medical records. With Yorxs, you can request a clinic appointment, read your test results, renew a prescription or communicate with your care team.     To sign up for Yorxs visit the website at [email or web address]   You will be asked to enter the access code listed below, as well as some personal information. Please follow the directions to create your username and password.     Your access code is: [email or web address]  Expires: 2017  7:51 PM     Your access code will  in 90 days. If you need help or a new code, please contact your [facility] or call [phone] for assistance.        Care EveryWhere ID     This is your Care EveryWhere ID. This could be used by other organizations to access your [place] medical records  [id number]        Your Vitals Were     Pulse Height BMI (Body Mass Index)             82 1.778 m (5' 10\") 33 kg/m2          Blood " Pressure from Last 3 Encounters:   No data found for BP    Weight from Last 3 Encounters:   No data found for Wt              Today, you had the following     No orders found for display       Primary Care Provider Office Phone # Fax #    Luke Macias [phone]       [facility] [address] [facility] [address]        Thank you!     Thank you for choosing [facility] PHYSICAL MEDICINE AND REHABILITATION  for your care. Our goal is always to provide you with excellent care. Hearing back from our patients is one way we can continue to improve our services. Please take a few minutes to complete the written survey that you may receive in the mail after your visit with us. Thank you!             Your Updated Medication List - Protect others around you: Learn how to safely use, store and throw away your medicines at www.disposemymeds.org.          This list is accurate as of: 3/3/17 11:59 PM.  Always use your most recent med list.                   Brand Name Dispense Instructions for use    acetaminophen 325 MG tablet    TYLENOL     Take 2 tablets by mouth as needed       amLODIPine 10 MG tablet    NORVASC     Take 10 mg by mouth daily       aspirin 81 MG tablet      Take 81 mg by mouth daily       buPROPion 150 MG 12 hr tablet    WELLBUTRIN SR     Take 150 mg by mouth 2 times daily       citalopram 40 MG tablet    celeXA     Take 40 mg by mouth daily       folic acid 1 MG tablet    FOLVITE     Take 1 mg by mouth daily       gabapentin 600 MG tablet    NEURONTIN     Take 2 tablets by mouth daily       hydrochlorothiazide 25 MG tablet    HYDRODIURIL     Take 25 mg by mouth daily       lisinopril 40 MG tablet    PRINIVIL/ZESTRIL     Take 40 mg by mouth daily       metoprolol 25 MG 24 hr tablet    TOPROL-XL     Take 25 mg by mouth 2 times daily       RA VITAMIN B-12 TR 1000 MCG Tbcr   Generic drug:  cyanocobalamin      Take 1,000 mcg by mouth daily       simvastatin 20 MG tablet     ZOCOR     Take 20 mg by mouth daily

## 2017-12-04 ENCOUNTER — TRANSFERRED RECORDS (OUTPATIENT)
Dept: HEALTH INFORMATION MANAGEMENT | Facility: CLINIC | Age: 67
End: 2017-12-04

## 2018-09-26 ENCOUNTER — TRANSFERRED RECORDS (OUTPATIENT)
Dept: HEALTH INFORMATION MANAGEMENT | Facility: CLINIC | Age: 68
End: 2018-09-26

## 2018-10-04 ENCOUNTER — TRANSFERRED RECORDS (OUTPATIENT)
Dept: HEALTH INFORMATION MANAGEMENT | Facility: CLINIC | Age: 68
End: 2018-10-04

## 2018-11-30 ENCOUNTER — TRANSFERRED RECORDS (OUTPATIENT)
Dept: HEALTH INFORMATION MANAGEMENT | Facility: CLINIC | Age: 68
End: 2018-11-30

## 2019-09-18 ENCOUNTER — TRANSFERRED RECORDS (OUTPATIENT)
Dept: HEALTH INFORMATION MANAGEMENT | Facility: CLINIC | Age: 69
End: 2019-09-18

## 2019-09-25 ENCOUNTER — TRANSFERRED RECORDS (OUTPATIENT)
Dept: HEALTH INFORMATION MANAGEMENT | Facility: CLINIC | Age: 69
End: 2019-09-25

## 2020-04-08 ENCOUNTER — RECORDS - HEALTHEAST (OUTPATIENT)
Dept: LAB | Facility: CLINIC | Age: 70
End: 2020-04-08

## 2020-04-08 LAB
BASOPHILS # BLD AUTO: 0 THOU/UL (ref 0–0.2)
BASOPHILS NFR BLD AUTO: 0 % (ref 0–2)
EOSINOPHIL COUNT (ABSOLUTE): 0.1 THOU/UL (ref 0–0.4)
EOSINOPHIL NFR BLD AUTO: 1 % (ref 0–6)
ERYTHROCYTE [DISTWIDTH] IN BLOOD BY AUTOMATED COUNT: 16.5 % (ref 11–14.5)
HCT VFR BLD AUTO: 32.6 % (ref 40–54)
HGB BLD-MCNC: 10.5 G/DL (ref 14–18)
LYMPHOCYTES # BLD AUTO: 0.8 THOU/UL (ref 0.8–4.4)
LYMPHOCYTES NFR BLD AUTO: 7 % (ref 20–40)
MCH RBC QN AUTO: 29.8 PG (ref 27–34)
MCHC RBC AUTO-ENTMCNC: 32.2 G/DL (ref 32–36)
MCV RBC AUTO: 93 FL (ref 80–100)
METAMYELOCYTES (ABSOLUTE): 0.3 THOU/UL
METAMYELOCYTES NFR BLD MANUAL: 3 %
MONOCYTES # BLD AUTO: 1.4 THOU/UL (ref 0–0.9)
MONOCYTES NFR BLD AUTO: 12 % (ref 2–10)
MYELOCYTES (ABSOLUTE): 0.2 THOU/UL
MYELOCYTES NFR BLD MANUAL: 2 %
OVALOCYTES: ABNORMAL
PLAT MORPH BLD: ABNORMAL
PLATELET # BLD AUTO: 37 THOU/UL (ref 140–440)
PMV BLD AUTO: ABNORMAL FL
POLYCHROMASIA BLD QL SMEAR: ABNORMAL
PROMYELOCYTES # BLD MANUAL: 0.1 THOU/UL
PROMYELOCYTES NFR BLD MANUAL: 1 %
RBC # BLD AUTO: 3.52 MILL/UL (ref 4.4–6.2)
TOTAL NEUTROPHILS-ABS(DIFF): 8.9 THOU/UL (ref 2–7.7)
TOTAL NEUTROPHILS-REL(DIFF): 76 % (ref 50–70)
WBC: 11.7 THOU/UL (ref 4–11)

## 2020-04-09 LAB
ANION GAP SERPL CALCULATED.3IONS-SCNC: 12 MMOL/L (ref 5–18)
BUN SERPL-MCNC: 11 MG/DL (ref 8–28)
CALCIUM SERPL-MCNC: 8.3 MG/DL (ref 8.5–10.5)
CHLORIDE BLD-SCNC: 107 MMOL/L (ref 98–107)
CO2 SERPL-SCNC: 22 MMOL/L (ref 22–31)
CREAT SERPL-MCNC: 0.95 MG/DL (ref 0.7–1.3)
ERYTHROCYTE [DISTWIDTH] IN BLOOD BY AUTOMATED COUNT: 16.8 % (ref 11–14.5)
GFR SERPL CREATININE-BSD FRML MDRD: >60 ML/MIN/1.73M2
GLUCOSE BLD-MCNC: 66 MG/DL (ref 70–125)
HCT VFR BLD AUTO: 33 % (ref 40–54)
HGB BLD-MCNC: 10.5 G/DL (ref 14–18)
MCH RBC QN AUTO: 30 PG (ref 27–34)
MCHC RBC AUTO-ENTMCNC: 31.8 G/DL (ref 32–36)
MCV RBC AUTO: 94 FL (ref 80–100)
PLATELET # BLD AUTO: 38 THOU/UL (ref 140–440)
POTASSIUM BLD-SCNC: 3.4 MMOL/L (ref 3.5–5)
RBC # BLD AUTO: 3.5 MILL/UL (ref 4.4–6.2)
SODIUM SERPL-SCNC: 141 MMOL/L (ref 136–145)
WBC: 12.6 THOU/UL (ref 4–11)

## 2020-09-29 ENCOUNTER — TRANSFERRED RECORDS (OUTPATIENT)
Dept: HEALTH INFORMATION MANAGEMENT | Facility: CLINIC | Age: 70
End: 2020-09-29

## 2020-10-21 DIAGNOSIS — K02.9 CARIES: Primary | ICD-10-CM

## 2020-10-29 ENCOUNTER — TRANSFERRED RECORDS (OUTPATIENT)
Dept: HEALTH INFORMATION MANAGEMENT | Facility: CLINIC | Age: 70
End: 2020-10-29

## 2020-10-29 DIAGNOSIS — C92.10 CML (CHRONIC MYELOCYTIC LEUKEMIA) (H): Primary | ICD-10-CM

## 2020-10-29 DIAGNOSIS — K02.9 CARIES: ICD-10-CM

## 2020-10-30 ENCOUNTER — PATIENT OUTREACH (OUTPATIENT)
Dept: ONCOLOGY | Facility: CLINIC | Age: 70
End: 2020-10-30

## 2020-10-30 NOTE — PROGRESS NOTES
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 and that we have recd the referral for seeing hematologist for recs for oral surgery  Explained to pt that he/she will receive a call from our scheduling intake team and provided our call-back number if needed.     Records Location: [facility] Everywhere     Records Needed:   [facility] - In CareEverywere *updated by writer  YES : records from [facility]    Referral updates and Plan:   October 30, 2020     call to referring doctor : Dr Marinelli explains pt needs completion extractions, not scheduled yet but will need plts per Dr Timmons pre- and during- and this will be done in OR after pt sees hematology/oncology at our institution for tx recs.     msg to records team: Need records from [facility]:from pt's hematologist Dr Carlos Timmons.   November 2, 2020     call to pt (above). RN reviewing.     Message to reviewing MD.     Re-requested intake team obtain [facility] records  November 3, 2020     Call to pt's wife, no answer. LVM requesting CB to discuss referral    Reviewed [facility] and oral surgery records. Pt has hx DLBCL as well.    LVM for Dr Miguel Marinelli that we have not been able to reach pt or his wife with attempts x 2. Provided our dept and scheduling call back numbers.    Scheduling instructions for intake team provided: schedule with Dr Gauthier on 11/10  Aneta Anaya, RN, BSN, OCN  RN Care Coordinator / Oncology Nurse Navigator   [facility]  Care  [phone]

## 2020-11-05 NOTE — TELEPHONE ENCOUNTER
Action    Action Taken 11/5/20:    -Spoke w/ Kavya @ [facility] - she will fax over older recs, and imaging reports done in their building (PETS 3/2020,, 12/5/17) & have imaging pushed. Kavya advised me all imaging done @ [facility].    -[employer] Tracking/[facility] - Path: [id number]    -[employer] Tracking/[facility] - Path: [id number]  9:48 AM    -Records from [facility] rec'd - sent to HIM for upload    -Imaging from [facility] resolved, and now viewable to PACS  12:31 PM    -11/9/20: Imaging from [facility] resolved, and now viewable to PACS  9:48 AM     RECORDS STATUS - ALL OTHER DIAGNOSIS      RECORDS RECEIVED FROM: [facility]   DATE RECEIVED:    NOTES STATUS DETAILS   OFFICE NOTE from referring provider [facility]   OFFICE NOTE from medical oncologist  [facility] Epic:  [facility]: 9/29/20 - 12/18/19 (additonal records requested 11/5)    Andre:  Dr. Barbara Carver: 10/21/20   DISCHARGE SUMMARY from hospital CE - [facility] 3/30/20, 12/11/17, 11/28/17   DISCHARGE REPORT from the ER NA    OPERATIVE REPORT CE - [facility] 3/18/20: Open Reduction w/ Internal Fixation Left Ankle Fracture  11/30/18: BMB  10/9/18: Colonoscopy  12/4/17: BMB  11/30/17: Colonoscopy/Endoscopy   MEDICATION LIST Epic/    CLINICAL TRIAL TREATMENTS TO DATE     LABS     PATHOLOGY REPORTS Reports in CE, Slides requested 11/5 [place]:  7/29/20: BR-    [facility]:  7/22/20: [id number]  3/14/19: [id number]  11/30/18: [id number]  11/6/18: [id number]  12/4/17: [id number]  11/30/17: [id number]   ANYTHING RELATED TO DIAGNOSIS Epic/CE 10/21/20   GENONOMIC TESTING     TYPE: CE - [facility] & [place] [facility]:  11/30/18, 12/4/17, 11/30/17: Cytogenetics    [place]:  1/17/19: NGS   IMAGING (NEED IMAGES & REPORT)     XR PACS 4/28/20 - 11/2017: [facility], Report in CE   CT SCANS PACS 4/28/20 - 11/2017: [facility], Report in CE   MRI PACS 4/28/20 - 11/2017: [facility], Report in CE   MAMMO     ULTRASOUND PACS 4/28/20 - 11/2017: [facility], Report in CE   PET PACS  [facility] - reports & imaging requested.

## 2020-11-10 ENCOUNTER — OFFICE VISIT (OUTPATIENT)
Dept: TRANSPLANT | Facility: CLINIC | Age: 70
End: 2020-11-10
Attending: INTERNAL MEDICINE
Payer: COMMERCIAL

## 2020-11-10 ENCOUNTER — PRE VISIT (OUTPATIENT)
Dept: TRANSPLANT | Facility: CLINIC | Age: 70
End: 2020-11-10

## 2020-11-10 VITALS
HEIGHT: 68 IN | BODY MASS INDEX: 28.39 KG/M2 | OXYGEN SATURATION: 95 % | SYSTOLIC BLOOD PRESSURE: 133 MMHG | HEART RATE: 60 BPM | RESPIRATION RATE: 18 BRPM | WEIGHT: 187.3 LBS | DIASTOLIC BLOOD PRESSURE: 86 MMHG | TEMPERATURE: 97 F

## 2020-11-10 DIAGNOSIS — K02.9 CARIES: ICD-10-CM

## 2020-11-10 DIAGNOSIS — C93.10 CHRONIC MYELOMONOCYTIC LEUKEMIA NOT HAVING ACHIEVED REMISSION (H): ICD-10-CM

## 2020-11-10 PROCEDURE — 99203 OFFICE O/P NEW LOW 30 MIN: CPT | Performed by: INTERNAL MEDICINE

## 2020-11-10 PROCEDURE — G0463 HOSPITAL OUTPT CLINIC VISIT: HCPCS

## 2020-11-10 ASSESSMENT — MIFFLIN-ST. JEOR: SCORE: 1573.34

## 2020-11-10 NOTE — LETTER
11/10/2020         RE: Asa Keith  [address]  [place] [address]        Dear Colleague,    Thank you for referring your patient, Asa Keith, to the [facility] [place]. Please see a copy of my visit note below.    Angela New Patient Visit  Nov 10, 2020     Reason for Visit: Establish institutional hematology care to help coordinate platelet transfusion support for dental extractions      Disease and Treatment History:  1. 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 Stroke and ? Of antiphosopholipid antibody syndrome treated with coumadin  4. History of leg fracture in 3/2020 after fall  5. Pleural effusion  6. Worsening platelet count and growing spleen. 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 NRAS+, SRSF2+, TET2+, IDH1 mutation of likely benign significance and not the known pathologic mutatoin  7. History of recent pneumonia and sepsis      HPI: Asa and his wife come in today to establish hematology care here for the sole purpose of helping to coordinate platelet transfusions during his extensive dental extractions. He notes one prior platelet transfusion for a procedure with a good response and no reactions. He notes no current fevers or chills. No pain in his mouth but is anxiously awaiting having teeth as it has been 6+ months since he was able to have steak. He notes no bleeding issues. No new neurologic issues. No other major concerns. Anxious to move forward with the dental extractions.    10 point ROS otherwise negative    Past Medical History:  1. DLBCL in CR  2. History of ITP  3. CMML-0  4. History of Anti-phospholipid antiphospholipid antibody syndrome but repeat testing per  "patient was negative so if off the coumadin with the low platelets  5. Pleural Effusion  6. History of Pneumonia/Sepsis    Social History: ETOH: 2-4 times a month. Tob none. . Family local. Retired     Family History: Non-contributory    Physical Exam:  /86 (BP Location: Right arm, Patient Position: Sitting, Cuff Size: Adult Regular)   Pulse 60   Temp 97  F (36.1  C) (Oral)   Resp 18   Ht 1.718 m (5' 7.64\")   Wt 85 kg (187 lb 4.8 oz)   SpO2 95%   BMI 28.78 kg/m    Gen: Well appearing, non-toxic. KPS 80  HEENT: bruise on right upper cheek. Numerous broken off teeth on upper and lower gums. No palpable cervical or supraclavicular FLACO  Lungs: CTAB no crackles or wheezes  CV: RRR no r/m/g  Ext: no edema  Skin: scattered bruises    Labs:  Recent labs from Care Everywhere 10/21/20:  WBC 15.1  Hgb 13.5  Platelets 35k     A/P: 69 yo man with history of Large B Cell Lymphoma, ITP, and CMML-0    1. CMML-0: stable without treatment. Follows with Dr. Timmons    2. Heme: platelets in the 30k range. Needs dental extractions done here at [facility] dental clinic and needs help with platelet transfusion coordination.    I would recommend planning on giving platelets the morning of the dental extraction with a pre platelet count, infusion of 2 packs of platelets, and a post platelet count. If platelets are above 50k then proceeding with the dental extractions without additional platelets would be reasonable. If platelets near but not quite at 50k would recommend a bag of platelets running during the infusion. Some of the logistics will depend on the location of the extraction. If the extraction is within the [facility] then having intra-procedure platelets will not be an issue. IF the extraction is to be done in the dental clinic then the platelet transfusion will need to occur prior to the extraction in the [facility] infusion center.      Patient consented for blood products, witnessed and " signed/initialled. No pre-medications needed.    Orders placed for 2 packs of platelets to be given prior to extractions, post platelet transfusion count, and instructions going forward.     Once I know the date for extractions we can coordinate the infusion for platelets.    30 minutes spent in care and coordination.      Sumaya Gauthier MD    Addendum: 11/11/20    Spoke to Dr. Marinelli from dental clinic.    Discussed the following plan:    1. Schedule the extractions in the hospital OR in the event that additional platelets are needed.  2. Once OR date known, I will schedule transfusion of 2 packs of platelets in the [facility] and a post platelet count  3. If platelets above 50k then proceed to the OR for check in and extractions  -- Plan to have 1 pack of platelets on hand in the OR in case of any bleeding issues  4. Patient to have close follow-up with his primary hematologist or us in the [facility] 1-2 days following extractions to assess for any bleeding issue and need for subsequent platelet transfusion.    Sumaya Gauthier MD

## 2020-11-10 NOTE — NURSING NOTE
"Oncology Rooming Note    November 10, 2020 9:14 AM   Asa Keith is a 70 year old male who presents for:    Chief Complaint   Patient presents with     Oncology Clinic Visit     Patient with CML here for provider vist     Initial Vitals: /86 (BP Location: Right arm, Patient Position: Sitting, Cuff Size: Adult Regular)   Pulse 60   Temp 97  F (36.1  C) (Oral)   Resp 18   Ht 1.718 m (5' 7.64\")   Wt 85 kg (187 lb 4.8 oz)   SpO2 95%   BMI 28.78 kg/m   Estimated body mass index is 28.78 kg/m  as calculated from the following:    Height as of this encounter: 1.718 m (5' 7.64\").    Weight as of this encounter: 85 kg (187 lb 4.8 oz). Body surface area is 2.01 meters squared.  Data Unavailable Comment: Data Unavailable   No LMP for male patient.  Allergies reviewed: Yes  Medications reviewed: Yes    Medications: Medication refills not needed today.  Pharmacy name entered into Answer.To: [facility] [id number] [place] [address]    Clinical concerns:     Olamide Larkin CMA              "

## 2020-11-10 NOTE — PROGRESS NOTES
[facility] New Patient Visit  Nov 10, 2020     Reason for Visit: Establish institutional hematology care to help coordinate platelet transfusion support for dental extractions      Disease and Treatment History:  1. 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 Stroke and ? Of antiphosopholipid antibody syndrome treated with coumadin  4. History of leg fracture in 3/2020 after fall  5. Pleural effusion  6. Worsening platelet count and growing spleen. 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 NRAS+, SRSF2+, TET2+, IDH1 mutation of likely benign significance and not the known pathologic mutatoin  7. History of recent pneumonia and sepsis      HPI: Asa and his wife come in today to establish hematology care here for the sole purpose of helping to coordinate platelet transfusions during his extensive dental extractions. He notes one prior platelet transfusion for a procedure with a good response and no reactions. He notes no current fevers or chills. No pain in his mouth but is anxiously awaiting having teeth as it has been 6+ months since he was able to have steak. He notes no bleeding issues. No new neurologic issues. No other major concerns. Anxious to move forward with the dental extractions.    10 point ROS otherwise negative    Past Medical History:  1. DLBCL in CR  2. History of ITP  3. CMML-0  4. History of Anti-phospholipid antiphospholipid antibody syndrome but repeat testing per patient was negative so if off the coumadin with the low platelets  5. Pleural Effusion  6. History of Pneumonia/Sepsis    Social History: ETOH: 2-4 times a month. Tob none. . Family local. Retired     Family History: Non-contributory    Physical Exam:  /86 (BP  "Location: Right arm, Patient Position: Sitting, Cuff Size: Adult Regular)   Pulse 60   Temp 97  F (36.1  C) (Oral)   Resp 18   Ht 1.718 m (5' 7.64\")   Wt 85 kg (187 lb 4.8 oz)   SpO2 95%   BMI 28.78 kg/m    Gen: Well appearing, non-toxic. KPS 80  HEENT: bruise on right upper cheek. Numerous broken off teeth on upper and lower gums. No palpable cervical or supraclavicular FLACO  Lungs: CTAB no crackles or wheezes  CV: RRR no r/m/g  Ext: no edema  Skin: scattered bruises    Labs:  Recent labs from [facility] 10/21/20:  WBC 15.1  Hgb 13.5  Platelets 35k     A/P: 71 yo man with history of Large B Cell Lymphoma, ITP, and CMML-0    1. CMML-0: stable without treatment. Follows with Dr. Timmons    2. Heme: platelets in the 30k range. Needs dental extractions done here at [facility] and needs help with platelet transfusion coordination.    I would recommend planning on giving platelets the morning of the dental extraction with a pre platelet count, infusion of 2 packs of platelets, and a post platelet count. If platelets are above 50k then proceeding with the dental extractions without additional platelets would be reasonable. If platelets near but not quite at 50k would recommend a bag of platelets running during the infusion. Some of the logistics will depend on the location of the extraction. If the extraction is within the [facility] then having intra-procedure platelets will not be an issue. IF the extraction is to be done in the dental clinic then the platelet transfusion will need to occur prior to the extraction in the [facility].      Patient consented for blood products, witnessed and signed/initialled. No pre-medications needed.    Orders placed for 2 packs of platelets to be given prior to extractions, post platelet transfusion count, and instructions going forward.     Once I know the date for extractions we can coordinate the infusion for platelets.    30 minutes " spent in care and coordination.      Sumaya Gauthier MD    Addendum: 11/11/20    Spoke to Dr. Marinelli from dental clinic.    Discussed the following plan:    1. Schedule the extractions in the hospital OR in the event that additional platelets are needed.  2. Once OR date known, I will schedule transfusion of 2 packs of platelets in the [facility] and a post platelet count  3. If platelets above 50k then proceed to the OR for check in and extractions  -- Plan to have 1 pack of platelets on hand in the OR in case of any bleeding issues  4. Patient to have close follow-up with his primary hematologist or us in the [facility] 1-2 days following extractions to assess for any bleeding issue and need for subsequent platelet transfusion.    Sumaya Gauthier MD

## 2020-11-13 NOTE — TELEPHONE ENCOUNTER
Action 11.13.20 MJ   Action Taken Received path slides from [facility]  [id number] slides  [id number] idss  Faxed reports to scanning.   Sent to [facility] for processing.

## 2020-11-16 DIAGNOSIS — Z11.59 ENCOUNTER FOR SCREENING FOR OTHER VIRAL DISEASES: Primary | ICD-10-CM

## 2020-11-16 PROBLEM — K02.9 CARIES: Status: ACTIVE | Noted: 2020-11-16

## 2020-11-17 PROCEDURE — 88321 CONSLTJ&REPRT SLD PREP ELSWR: CPT | Mod: 26 | Performed by: STUDENT IN AN ORGANIZED HEALTH CARE EDUCATION/TRAINING PROGRAM

## 2020-11-17 PROCEDURE — 999N001031 HC STATISTIC REV BONE MARROW OUTSIDE SLIDES TC 88321: Performed by: INTERNAL MEDICINE

## 2020-11-18 NOTE — TELEPHONE ENCOUNTER
FUTURE VISIT INFORMATION      SURGERY INFORMATION:    Date: 20    Location: [facility] OR    Surgeon:  Elizabeth Casanova DDS    Anesthesia Type:  General    Procedure: EXTRACTION, TOOTH #3, 5, 6, 7, 10, 11, 12, 13, 21, 23, 24, 25, 26 ALVEOLOPLASTY    RECORDS REQUESTED FROM:       Primary Care Provider: Luke Macias MD  - [facility]    Most recent EKG+ Tracin/3/20- [facility]    Most recent ECHO: 20- [facility]    Most recent Sleep Study:  10/5/11- [facility]

## 2020-11-20 ENCOUNTER — ANESTHESIA EVENT (OUTPATIENT)
Dept: SURGERY | Facility: CLINIC | Age: 70
End: 2020-11-20
Payer: COMMERCIAL

## 2020-11-20 ENCOUNTER — PRE VISIT (OUTPATIENT)
Dept: SURGERY | Facility: CLINIC | Age: 70
End: 2020-11-20

## 2020-11-20 ENCOUNTER — OFFICE VISIT (OUTPATIENT)
Dept: SURGERY | Facility: CLINIC | Age: 70
End: 2020-11-20
Payer: COMMERCIAL

## 2020-11-20 VITALS
BODY MASS INDEX: 27.43 KG/M2 | WEIGHT: 181 LBS | TEMPERATURE: 97.4 F | RESPIRATION RATE: 16 BRPM | SYSTOLIC BLOOD PRESSURE: 121 MMHG | HEIGHT: 68 IN | DIASTOLIC BLOOD PRESSURE: 83 MMHG | HEART RATE: 64 BPM | OXYGEN SATURATION: 96 %

## 2020-11-20 DIAGNOSIS — D69.6 THROMBOCYTOPENIA (H): ICD-10-CM

## 2020-11-20 DIAGNOSIS — Z01.818 PREOP EXAMINATION: Primary | ICD-10-CM

## 2020-11-20 DIAGNOSIS — C93.10 CHRONIC MYELOMONOCYTIC LEUKEMIA NOT HAVING ACHIEVED REMISSION (H): Primary | ICD-10-CM

## 2020-11-20 DIAGNOSIS — C93.10 CHRONIC MYELOMONOCYTIC LEUKEMIA NOT HAVING ACHIEVED REMISSION (H): ICD-10-CM

## 2020-11-20 DIAGNOSIS — K02.9 DENTAL CARIES: ICD-10-CM

## 2020-11-20 DIAGNOSIS — Z86.79 HISTORY OF ATRIAL FIBRILLATION: ICD-10-CM

## 2020-11-20 LAB
ALBUMIN SERPL-MCNC: 4.2 G/DL (ref 3.4–5)
ALP SERPL-CCNC: 129 U/L (ref 40–150)
ALT SERPL W P-5'-P-CCNC: 25 U/L (ref 0–70)
ANION GAP SERPL CALCULATED.3IONS-SCNC: 5 MMOL/L (ref 3–14)
ANISOCYTOSIS BLD QL SMEAR: SLIGHT
AST SERPL W P-5'-P-CCNC: 33 U/L (ref 0–45)
BASOPHILS # BLD AUTO: 0.1 10E9/L (ref 0–0.2)
BASOPHILS NFR BLD AUTO: 0.9 %
BILIRUB SERPL-MCNC: 0.9 MG/DL (ref 0.2–1.3)
BUN SERPL-MCNC: 22 MG/DL (ref 7–30)
CALCIUM SERPL-MCNC: 9.1 MG/DL (ref 8.5–10.1)
CHLORIDE SERPL-SCNC: 106 MMOL/L (ref 94–109)
CO2 SERPL-SCNC: 30 MMOL/L (ref 20–32)
CREAT SERPL-MCNC: 1.13 MG/DL (ref 0.66–1.25)
DIFFERENTIAL METHOD BLD: ABNORMAL
EOSINOPHIL # BLD AUTO: 0 10E9/L (ref 0–0.7)
EOSINOPHIL NFR BLD AUTO: 0 %
ERYTHROCYTE [DISTWIDTH] IN BLOOD BY AUTOMATED COUNT: 19.2 % (ref 10–15)
GFR SERPL CREATININE-BSD FRML MDRD: 65 ML/MIN/{1.73_M2}
GLUCOSE SERPL-MCNC: 81 MG/DL (ref 70–99)
HCT VFR BLD AUTO: 44 % (ref 40–53)
HGB BLD-MCNC: 13.5 G/DL (ref 13.3–17.7)
INTERPRETATION ECG - MUSE: NORMAL
LYMPHOCYTES # BLD AUTO: 1.7 10E9/L (ref 0.8–5.3)
LYMPHOCYTES NFR BLD AUTO: 11.5 %
MCH RBC QN AUTO: 29.3 PG (ref 26.5–33)
MCHC RBC AUTO-ENTMCNC: 30.7 G/DL (ref 31.5–36.5)
MCV RBC AUTO: 95 FL (ref 78–100)
METAMYELOCYTES # BLD: 0.3 10E9/L
METAMYELOCYTES NFR BLD MANUAL: 1.8 %
MONOCYTES # BLD AUTO: 2.4 10E9/L (ref 0–1.3)
MONOCYTES NFR BLD AUTO: 16.8 %
MYELOCYTES # BLD: 0.3 10E9/L
MYELOCYTES NFR BLD MANUAL: 1.8 %
NEUTROPHILS # BLD AUTO: 9.7 10E9/L (ref 1.6–8.3)
NEUTROPHILS NFR BLD AUTO: 67.2 %
NT-PROBNP SERPL-MCNC: 229 PG/ML (ref 0–125)
PLATELET # BLD AUTO: 38 10E9/L (ref 150–450)
PLATELET # BLD EST: ABNORMAL 10*3/UL
POIKILOCYTOSIS BLD QL SMEAR: SLIGHT
POTASSIUM SERPL-SCNC: 3.4 MMOL/L (ref 3.4–5.3)
PROT SERPL-MCNC: 6.9 G/DL (ref 6.8–8.8)
RBC # BLD AUTO: 4.61 10E12/L (ref 4.4–5.9)
SODIUM SERPL-SCNC: 140 MMOL/L (ref 133–144)
WBC # BLD AUTO: 14.5 10E9/L (ref 4–11)

## 2020-11-20 PROCEDURE — 93010 ELECTROCARDIOGRAM REPORT: CPT | Performed by: INTERNAL MEDICINE

## 2020-11-20 PROCEDURE — 86900 BLOOD TYPING SEROLOGIC ABO: CPT | Mod: 90 | Performed by: PATHOLOGY

## 2020-11-20 PROCEDURE — 36415 COLL VENOUS BLD VENIPUNCTURE: CPT | Performed by: PATHOLOGY

## 2020-11-20 PROCEDURE — 86850 RBC ANTIBODY SCREEN: CPT | Mod: 90 | Performed by: PATHOLOGY

## 2020-11-20 PROCEDURE — 85025 COMPLETE CBC W/AUTO DIFF WBC: CPT | Performed by: PATHOLOGY

## 2020-11-20 PROCEDURE — 80053 COMPREHEN METABOLIC PANEL: CPT | Performed by: PATHOLOGY

## 2020-11-20 PROCEDURE — 86901 BLOOD TYPING SEROLOGIC RH(D): CPT | Mod: 90 | Performed by: PATHOLOGY

## 2020-11-20 PROCEDURE — 99203 OFFICE O/P NEW LOW 30 MIN: CPT | Performed by: CLINICAL NURSE SPECIALIST

## 2020-11-20 PROCEDURE — 83880 ASSAY OF NATRIURETIC PEPTIDE: CPT | Performed by: PATHOLOGY

## 2020-11-20 ASSESSMENT — ENCOUNTER SYMPTOMS: DYSRHYTHMIAS: 1

## 2020-11-20 ASSESSMENT — LIFESTYLE VARIABLES: TOBACCO_USE: 0

## 2020-11-20 ASSESSMENT — MIFFLIN-ST. JEOR: SCORE: 1550.51

## 2020-11-20 NOTE — H&P
"  Pre-Operative H & P     CC:  Preoperative exam to assess for increased cardiopulmonary risk while undergoing surgery and anesthesia.    Date of Encounter: 11/20/2020  Primary Care Physician:  Luke Macias  Reason for visit: Caries [K02.9]    HPI  Asa Keith is a 70 year old male who presents for pre-operative H & P in preparation for EXTRACTION, TOOTH #3, 5, 6, 7, 10, 11, 12, 13, 21, 23, 24, 25, 26, ALVEOLOPLASTY with Dr. Hoffman on 12/1/20 at [facility]. History is obtained from the patient and medical records.    Patient with dental caries interfering with his nutrition and recently evaluated by the Dental Clinic. Above extractions were recommended.     His history is otherwise complex with B Cell Lympoma with high IPI post R-CHOP X 6 completed 3/2018, and post chemotherapy radiation at the hepatic flexure in 6/2018. He is followed by Dr. Timmons through [facility] every 2-3 months. 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 He was admitted to OSH from 3/30/20-4/3/20 for pneumonia. He was discharged to NH, then had home health for a time. He lives with his wife.    The patient was referred to Dr. Gauthier to assist with plan for platelet transfusion support for dental extractions. Her plan per notes:  \"I would recommend planning on giving platelets the morning of the dental extraction with a pre platelet count, infusion of 2 packs of platelets, and a post platelet count. If platelets are above 50k then proceeding with the dental extractions without additional platelets would be reasonable. If platelets near but " "not quite at 50k would recommend a bag of platelets running during the infusion. Some of the logistics will depend on the location of the extraction. If the extraction is within the [facility] then having intra-procedure platelets will not be an issue. If the extraction is to be done in the dental clinic then the platelet transfusion will need to occur prior to the extraction in the [facility] infusion [facility].\"    Today patient denies fever, cough, shortness of breath, chest pain, irregular HR, or ankle edema. He is able to walk short distances and walks on the treadmill for 10 minutes at a time. He denies pain.    Past Medical History  Past Medical History:   Diagnosis Date     Anemia      Antiphospholipid syndrome (H)      Atrial fibrillation (H)      Cellulitis      Chronic myelomonocytic leukemia not having achieved remission (H)      Closed burst fracture of lumbar vertebra (H)      Deep vein thrombosis (DVT) of left lower extremity (H)     pt denies     Dental caries      Diverticulitis of colon      Hemiplegia affecting left dominant side (H)      History of blood transfusion      History of GI bleed      History of ITP      HLD (hyperlipidemia)      NAE (obstructive sleep apnea)      Penile cancer (H)      Restless legs syndrome (RLS)     pt denies     Stroke (H)        Past Surgical History  Past Surgical History:   Procedure Laterality Date     BONE MARROW BIOPSY       CIRCUMCISION  2000     COLONOSCOPY       LASIK Right      LIVER BIOPSY       OPEN REDUCTION INTERNAL FIXATION ANKLE       Refractive surgery  2000       Hx of Blood transfusions/reactions: Yes, no known reactions.     Hx of abnormal bleeding or anti-platelet use: ITP, history of GI bleed.    Menstrual history: No LMP for male patient.    Steroid use in the last year: Denies.     Personal or FH with difficulty with Anesthesia:  Denies.    Prior to Admission Medications  Current Outpatient Medications   Medication Sig Dispense " Refill     acetaminophen (TYLENOL) 325 MG tablet Take 2 tablets by mouth as needed       amLODIPine (NORVASC) 10 MG tablet Take 10 mg by mouth every morning        buPROPion (WELLBUTRIN SR) 150 MG 12 hr tablet Take 150 mg by mouth 2 times daily       citalopram (CELEXA) 40 MG tablet Take 40 mg by mouth every morning        cyanocobalamin (RA VITAMIN B-12 TR) 1000 MCG TBCR Take 1,000 mcg by mouth every morning        folic acid (FOLVITE) 1 MG tablet Take 1 mg by mouth every morning        gabapentin (NEURONTIN) 600 MG tablet Take 2 tablets by mouth 2 times daily        hydrochlorothiazide (HYDRODIURIL) 25 MG tablet Take 25 mg by mouth every morning        lisinopril (PRINIVIL/ZESTRIL) 40 MG tablet Take 40 mg by mouth every morning        metoprolol (TOPROL-XL) 25 MG 24 hr tablet Take 25 mg by mouth 2 times daily       simvastatin (ZOCOR) 20 MG tablet Take 20 mg by mouth every morning          Allergies  No Known Allergies    Social History  Social History     Socioeconomic History     Marital status:      Spouse name: Not on file     Number of children: Not on file     Years of education: Not on file     Highest education level: Not on file   Occupational History     Not on file   Social Needs     Financial resource strain: Not on file     Food insecurity     Worry: Not on file     Inability: Not on file     Transportation needs     Medical: Not on file     Non-medical: Not on file   Tobacco Use     Smoking status: Never Smoker     Smokeless tobacco: Never Used   Substance and Sexual Activity     Alcohol use: Yes     Alcohol/week: 2.0 standard drinks     Types: 2 Cans of beer per week     Comment: DRINKS occasionally     Drug use: No     Sexual activity: Not on file   Lifestyle     Physical activity     Days per week: Not on file     Minutes per session: Not on file     Stress: Not on file   Relationships     Social connections     Talks on phone: Not on file     Gets together: Not on file     Attends  "Religion service: Not on file     Active member of club or organization: Not on file     Attends meetings of clubs or organizations: Not on file     Relationship status: Not on file     Intimate partner violence     Fear of current or ex partner: Not on file     Emotionally abused: Not on file     Physically abused: Not on file     Forced sexual activity: Not on file   Other Topics Concern     Not on file   Social History Narrative     Not on file       Family History  Family History   Problem Relation Age of Onset     Osteoporosis Mother      Heart Disease Father      Rheumatoid Arthritis Sister      Colon Cancer Sister      Preop Vitals    BP Readings from Last 3 Encounters:   11/10/20 133/86   03/03/17 146/90   03/01/17 149/88    Pulse Readings from Last 3 Encounters:   11/10/20 60   03/03/17 82   03/01/17 61      Resp Readings from Last 3 Encounters:   11/10/20 18   02/22/17 20   02/21/17 20    SpO2 Readings from Last 3 Encounters:   11/10/20 95%   02/22/17 97%   02/21/17 96%      Temp Readings from Last 1 Encounters:   11/10/20 97  F (36.1  C) (Oral)    Ht Readings from Last 1 Encounters:   11/10/20 1.718 m (5' 7.64\")      Wt Readings from Last 1 Encounters:   11/10/20 85 kg (187 lb 4.8 oz)    Estimated body mass index is 28.78 kg/m  as calculated from the following:    Height as of 11/10/20: 1.718 m (5' 7.64\").    Weight as of 11/10/20: 85 kg (187 lb 4.8 oz).     ROS/MED HX  The complete review of systems is negative other than noted in the HPI or here.     ENT/Pulmonary:     (+)sleep apnea, , recent URI . .   (-) tobacco use   Neurologic: Comment: RLS    (+)CVA     Cardiovascular:     (+) Dyslipidemia, ----. : . . . :. dysrhythmias a-fib, . Previous cardiac testing date:results:date: results:ECG reviewed date: results: date: results:         (-) taking anticoagulants/antiplatelets   METS/Exercise Tolerance:  3 - Able to walk 1-2 blocks without stopping   Hematologic: Comments: History antiphospholipid " "antibody syndrome. History of blood transfusion.    (+) History of blood clots pt is not anticoagulated, Other Hematologic Disorder-Hx ITP, platelets in 30 range      Musculoskeletal: Comment: History leg fracture        GI/Hepatic: Comment: Diverticular disease        Renal/Genitourinary:     (+) chronic renal disease, type: ARF,       Endo:  - neg endo ROS       Psychiatric: Anxiety/depression       Infectious Disease:  - neg infectious disease ROS       Malignancy:   (+) Malignancy History of Lymphoma/Leukemia and Other  Lymph CA status post Chemo, Other CA penile Remission status post Surgery         Other:    (+) No chance of pregnancy C-spine cleared: N/A, no H/O Chronic Pain,no other significant disability              PHYSICAL EXAM:   Mental Status/Neuro: A/A/O; Age Appropriate   Airway: Facies: Feasible  Mallampati: I  Mouth/Opening: Full  TM distance: > 6 cm   Respiratory: Auscultation: CTAB     Resp. Rate: Normal     Resp. Effort: Normal      CV: Rhythm: Regular  Heart: Normal Sounds   Comments: Poor dentition     Dental: Details            Temp: 97.4  F (36.3  C) Temp src: Oral BP: 121/83 Pulse: 64   Resp: 16 SpO2: 96 %         181 lbs 0 oz  5' 8\"[pt reported[   Body mass index is 27.52 kg/m .       Physical Exam  Constitutional: Awake, alert, cooperative, no apparent distress, and appears stated age.  Eyes: Pupils equal, round and reactive to light, extra ocular muscles intact, sclera clear, conjunctiva normal.  HENT: Normocephalic, oral pharynx with moist mucus membranes, poor dentition. No goiter appreciated.   Respiratory: Clear to auscultation bilaterally, no crackles or wheezing. No cough or obvious dyspnea.  Cardiovascular: Regular rate and rhythm, normal S1 and S2, and no murmur noted.  Carotids +2, no bruits. No edema. Palpable pulses to radial  DP and PT arteries.   GI: Normal bowel sounds, soft, non-distended, non-tender, no masses palpated.  Lymph/Hematologic: No cervical lymphadenopathy and " no supraclavicular lymphadenopathy.  Genitourinary: Deferred.   Skin: Warm and dry.  No rashes at anticipated surgical site. Right upper chest port. Bruising at bilateral hands and forearms.  Musculoskeletal: Limited ROM of neck. There is no redness, warmth, or swelling of the joints. Gross motor strength is weakened.  Neurologic: Awake, alert, oriented to name, place and time. Slowed responses, some issues with recall. Cranial nerves II-XII are grossly intact. Gait is normal. Left side  strength weakened. Leg push normal/equal.  Neuropsychiatric: Calm, cooperative. Normal affect.     Labs: (personally reviewed)  Lab Results   Component Value Date    WBC 14.5 11/20/2020     Lab Results   Component Value Date    RBC 4.61 11/20/2020     Lab Results   Component Value Date    HGB 13.5 11/20/2020     Lab Results   Component Value Date    HCT 44.0 11/20/2020     Lab Results   Component Value Date    MCV 95 11/20/2020     Lab Results   Component Value Date    MCH 29.3 11/20/2020     Lab Results   Component Value Date    MCHC 30.7 11/20/2020     Lab Results   Component Value Date    RDW 19.2 11/20/2020     Lab Results   Component Value Date    PLT 38 11/20/2020     Last Comprehensive Metabolic Panel:  Sodium   Date Value Ref Range Status   11/20/2020 140 133 - 144 mmol/L Final     Potassium   Date Value Ref Range Status   11/20/2020 3.4 3.4 - 5.3 mmol/L Final     Chloride   Date Value Ref Range Status   11/20/2020 106 94 - 109 mmol/L Final     Carbon Dioxide   Date Value Ref Range Status   11/20/2020 30 20 - 32 mmol/L Final     Anion Gap   Date Value Ref Range Status   11/20/2020 5 3 - 14 mmol/L Final     Glucose   Date Value Ref Range Status   11/20/2020 81 70 - 99 mg/dL Final     Urea Nitrogen   Date Value Ref Range Status   11/20/2020 22 7 - 30 mg/dL Final     Creatinine   Date Value Ref Range Status   11/20/2020 1.13 0.66 - 1.25 mg/dL Final     GFR Estimate   Date Value Ref Range Status   11/20/2020 65 >60  mL/min/[1.73_m2] Final     Comment:     Non  GFR Calc  Starting 2018, serum creatinine based estimated GFR (eGFR) will be   calculated using the Chronic Kidney Disease Epidemiology Collaboration   (CKD-EPI) equation.       Calcium   Date Value Ref Range Status   2020 9.1 8.5 - 10.1 mg/dL Final     EKG: Personally reviewed but formal cardiology read pendin20 Normal sinus rhythm  Cardiac echo: 2020  Final Conclusion Previous Study: 2018   1. Technically challenging echocardiogram.   2. Small left ventricular cavity size with hyperdynamic systolic function. Estimated left   ventricular ejection fraction is 70-75%.   3. Mild dynamic left ventricular mid-cavity obstruction. Left ventricular mid-cavity systolic   peak velocity is 1.9 m/sec.   4. Normal right ventricular size and systolic function.   5. Aortic valve was not well visualized. Mild-moderate calcific aortic valve stenosis. Aortic   valve systolic mean gradient is 12 mmHg   (stroke voluem index is  23 ml/m ). Aortic valve area by Doppler is 1.5 cm .   6. When compared to the previous echocardiographic report of 2018, there has been no   significant change.     Estimated EF: 70-75%  CXR 2020  IMPRESSION: Slight elevation right hemidiaphragm again seen. Lungs appear clear.  Previous bibasilar pulmonary infiltrates have essentially resolved. No effusion.  Heart size and pulmonary vascularity normal. Right Port-A-Cath tip in the SVC.    3/30/2020 CT chest PE study  IMPRESSION:  1.  No pulmonary embolism.    2.  Right lower lobe pneumonia.    19 MR lumbar spine  CONCLUSION:  1. Severe L1 compression fracture with associated greater than 90% height loss  and retropulsion of the posterior aspect of the vertebral body, as well as mild  STIR/T2 hyperintensity/edema within the central aspect of the centrally  compressed component. Associated moderate spinal canal stenosis and severe right  lateral recess  stenosis. In comparison to the 01/26/2018 MRI lumbar spine, edema  has largely resolved and the degree of spinal canal stenosis is stable.  2. Mild compression fracture with 10-20% height loss with STIR/T2  hyperintensity/edema along the anterior margin and beneath the superior  endplate. No associated retropulsion. This is new since the comparison  examination and may be acute or subacute.  3. Intravertebral herniation/Schmorl's node L3 associated with a superior  endplate deformity which is also associated with edema. This is new since the  comparison examination and may be acute or subacute.  4. Background degenerative changes of the lumbar spine are largely stable.  Notable findings include a disc protrusion at L5-S1 posteriorly displacing the  traversing right S1 nerve root, severe right L1-L2 neural foraminal stenosis and  severe right lateral recess stenosis.  5. STIR hyperintense lesion right sacral ala. Indeterminate, stable and may  reflect an atypical hemangioma with a disproportionate degree of vascular rather  than fatty stroma.  6. Diffusely hypointense bone marrow, stable and consistent with a marrow  replacing process in the context of a reported history of malignancy.  7. Probable renal and hepatic cysts. Motion artifact degrades evaluation of the  abdominal pelvic soft tissue structures.    Imaging and cardiac testing reviewed by this provider      Outside records reviewed from: [facility], [facility] Everywhere    ASSESSMENT and PLAN  Asa Keith is a 70 year old male scheduled to undergo EXTRACTION, TOOTH #3, 5, 6, 7, 10, 11, 12, 13, 21, 23, 24, 25, 26, ALVEOLOPLASTY with Dr. Hoffman on 12/1/20. He has the following specific operative considerations:   - RCRI : 0.9% risk of major adverse cardiac event.   - Anesthesia considerations:  Refer to PAC assessment in anesthesia records  - VTE risk: 3-4.5%  - Risk of PONV score = 2.  If > 2, anti-emetic intervention recommended.    --Dental caries  with above procedures now planned.   --No history of problems with anesthesia.  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 Followed at [facility] and is stable.   --ITP treated with romiplostim -- stopped 11/2018 after marrow showed possible myeloid neoplasm.   --Concern for platelets with procedure planned. Consult with Dr. Gauthier with above plan for platelet infusion and management prior to procedure. Platelets 38 today. Type and screen drawn today. History of blood transfusions.  --History of stroke 6 six years ago with residual left upper extremity weakness. Some recall issues.  --HLD. Will take simvastatin on DOS. HTN. Will take amlodipine and metoprolol on DOS, but will hold HCTZ and lisinopril. Documented atrial fibrillation in 4/2020, possibly during acute illness. EKG today NSR. Denies chest pain or irregular HR. Limited activity. Walks short distances.  --Never smoker. Denies pulmonary symptoms. Denies NAE. History of recent pneumonia and sepsis. He was admitted to OSH from 3/30/20-4/3/20, followed by stay in nursing facility and home care. Lung CTA today.   --Chart history of DVT. Patient denies.   --History of JESSICA. Cr today 1.13. Denies history of dialysis.  --Anxiety/depression. Will take bupropion and citalopram on DOS.   --Chart history of RLS, patient denies.   --Denies pain but takes gabapentin TWICE DAILY.  --Type and screen drawn today.     Arrival time, NPO, shower and medication instructions provided by nursing staff today. Preparing For Your Surgery handout given.      Patient was discussed with Dr Curry.    NITESH Langston CNS  Preoperative Assessment Center  [facility]  Cl[facility] and [facility] Center  Phone: [phone]  Fax: [phone]

## 2020-11-20 NOTE — PATIENT INSTRUCTIONS
Preparing for Your Surgery      Name:  Asa Keith   MRN:  [id number]   :  1950   Today's Date:  2020       Arriving for surgery:  Surgery date:  2020  Arrival time:  12:45 pm    Restrictions due to COVID 19:  No visitors are allowed at this time.    [employer] parking is available for anyone with mobility limitations or disabilities.  ([place]  24 hours/ 7 days a week; [facility]  7 am- 3:30 pm, Mon- Fri)    Please come to:       [facility] [place] [address]  [address] [place]  [address]       -    Please proceed to the Surgery Lounge on the 3rd floor. [phone]?     - ?If you are in need of directions, wheelchair or escort please stop at the Information Desk in the lobby.     What can I eat or drink?  -  You may eat and drink normally for up to 8 hours before your surgery. (Until 6 am)  -  You may have clear liquids until 2 hours before surgery. (Until 12:45 pm arrival time)    Examples of clear liquids:  Water  Clear broth  Juices (apple, white grape, white cranberry  and cider) without pulp  Noncarbonated, powder based beverages  (lemonade and Gentry-Aid)  Sodas (Sprite, 7-Up, ginger ale and seltzer)  Coffee or tea (without milk or cream)  Gatorade    -  No Alcohol for at least 24 hours before surgery     Which medicines can I take?    Hold Aspirin for 7 days before surgery.   Hold Multivitamins for 7 days before surgery.  Hold Supplements for 7 days before surgery.  Hold Ibuprofen (Advil, Motrin) for 1 day before surgery--unless otherwise directed by surgeon.  Hold Naproxen (Aleve) for 4 days before surgery.    -  DO NOT take these medications the day of surgery:  Hydrochlorothiazide   Lisinopril      -  PLEASE TAKE these medications the day of surgery:  Acetaminophen if needed  Amlodipine   Bupropion  Citalopram  Vitamin B 12  Folic acid  Gabapentin  Metoprolol   Simvastatin      How do I prepare myself?  - Please take 2 showers before surgery using  Scrubcare or Hibiclens soap.    Use this soap only from the neck to your toes.     Leave the soap on your skin for one minute--then rinse thoroughly.      You may use your own shampoo and conditioner; no other hair products.   - Please remove all jewelry and body piercings.  - No lotions, deodorants or fragrance.  - No makeup or fingernail polish.   - Bring your ID and insurance card.    - All patients are required to have a Covid-19 test within 4 days of surgery/procedure.      -Patients will be contacted by the [facility] scheduling team within 1 week of surgery to make an appointment.      - Patients may call the Scheduling team at [phone] if they have not been scheduled within 4 days of  surgery.      ALL PATIENTS GOING HOME THE SAME DAY OF SURGERY ARE REQUIRED TO HAVE A RESPONSIBLE ADULT TO DRIVE AND BE IN ATTENDANCE WITH THEM FOR 24 HOURS FOLLOWING SURGERY     Questions or Concerns:    - For any questions regarding the day of surgery or your hospital stay, please contact the Pre Admission Nursing Office at [phone].       - If you have health changes between today and your surgery please call your surgeon.       For questions after surgery please call your surgeons office.

## 2020-11-20 NOTE — ANESTHESIA PREPROCEDURE EVALUATION
"Anesthesia Pre-Procedure Evaluation    Patient: Asa Keith   MRN:     [id number] Gender:   male   Age:    70 year old :      1950        Preoperative Diagnosis: Caries [K02.9]   Procedure(s):  EXTRACTION, TOOTH #3, 5, 6, 7, 10, 11, 12, 13, 21, 23, 24, 25, 26  ALVEOLOPLASTY     LABS:  CBC: No results found for: WBC, HGB, HCT, PLT  BMP: No results found for: NA, POTASSIUM, CHLORIDE, CO2, BUN, CR, GLC  COAGS: No results found for: PTT, INR, FIBR  POC: No results found for: BGM, HCG, HCGS  OTHER: No results found for: PH, LACT, A1C, BARRETT, PHOS, MAG, ALBUMIN, PROTTOTAL, ALT, AST, GGT, ALKPHOS, BILITOTAL, BILIDIRECT, LIPASE, AMYLASE, DAVEY, TSH, T4, T3, CRP, SED     Preop Vitals    BP Readings from Last 3 Encounters:   11/10/20 133/86   17 146/90   17 149/88    Pulse Readings from Last 3 Encounters:   11/10/20 60   17 82   17 61      Resp Readings from Last 3 Encounters:   11/10/20 18   17 20   17 20    SpO2 Readings from Last 3 Encounters:   11/10/20 95%   17 97%   17 96%      Temp Readings from Last 1 Encounters:   11/10/20 97  F (36.1  C) (Oral)    Ht Readings from Last 1 Encounters:   11/10/20 1.718 m (5' 7.64\")      Wt Readings from Last 1 Encounters:   11/10/20 85 kg (187 lb 4.8 oz)    Estimated body mass index is 28.78 kg/m  as calculated from the following:    Height as of 11/10/20: 1.718 m (5' 7.64\").    Weight as of 11/10/20: 85 kg (187 lb 4.8 oz).     LDA:        Past Medical History:   Diagnosis Date     Anemia      Antiphospholipid syndrome (H)      Atrial fibrillation (H)      Cellulitis      Chronic myelomonocytic leukemia not having achieved remission (H)      Closed burst fracture of lumbar vertebra (H)      Deep vein thrombosis (DVT) of left lower extremity (H)      Dental caries      Diverticulitis of colon      Hemiplegia affecting left dominant side (H)      History of blood transfusion      History of GI bleed      History of ITP      HLD " (hyperlipidemia)      NAE (obstructive sleep apnea)      Penile cancer (H)      Restless legs syndrome (RLS)      Stroke (H)       Past Surgical History:   Procedure Laterality Date     BONE MARROW BIOPSY       CIRCUMCISION  2000     COLONOSCOPY       LASIK Right      LIVER BIOPSY       OPEN REDUCTION INTERNAL FIXATION ANKLE       Refractive surgery  2000      No Known Allergies     Anesthesia Evaluation     . Pt has had prior anesthetic. Type: General and MAC    No history of anesthetic complications          ROS/MED HX    ENT/Pulmonary: Comment: pneumonia 3/30-4/3/20. Discharge to NH, then      (+)sleep apnea, doesn't use CPAP , recent URI resolved . .   (-) tobacco use   Neurologic: Comment: RLS-chart history, patient denies    (+)CVA date: 6 years ago with deficits- left upper arm weakness, some memory issues,     Cardiovascular: Comment: Doc a fib 4/2020    (+) Dyslipidemia, hypertension----. : . . . :. dysrhythmias a-fib, . Previous cardiac testing Echodate:4/1/2020results:date: results:ECG reviewed date:11/20/20 results:NSR date: results:         (-) taking anticoagulants/antiplatelets   METS/Exercise Tolerance: Comment: Able to walk on treadmill for 10 min 1 - Eating, dressing   Hematologic: Comments: History antiphospholipid antibody syndrome. History of blood and platelet transfusion. Chart history DVT LLE, patient denies.    (+) History of blood clots pt is not anticoagulated, Anemia, History of Transfusion no previous transfusion reaction Other Hematologic Disorder-Hx ITP, platelets in 30 range      Musculoskeletal: Comment: History leg fracture  History closed burst fracture lumbar vertebra        GI/Hepatic: Comment: Diverticular disease        Renal/Genitourinary:     (+) chronic renal disease, type: ARF, Pt does not require dialysis, Pt has no history of transplant,       Endo:  - neg endo ROS       Psychiatric:     (+) psychiatric history anxiety and depression      Infectious Disease:  - neg  infectious disease ROS       Malignancy:   (+) Malignancy History of Lymphoma/Leukemia and Other  Lymph CA status post Chemo and Radiation, Other CA penile Remission status post Surgery         Other:    (+) No chance of pregnancy C-spine cleared: N/A, no H/O Chronic Pain,no other significant disability                        PHYSICAL EXAM:   Mental Status/Neuro: A/A/O; Age Appropriate   Airway: Facies: Feasible  Mallampati: I  Mouth/Opening: Full  TM distance: > 6 cm  Neck ROM: Limited   Respiratory: Auscultation: CTAB     Resp. Rate: Normal     Resp. Effort: Normal      CV: Rhythm: Regular  Heart: Normal Sounds  Edema: None   Comments: Poor dentition     Dental: Details                Assessment:   ASA SCORE: 3    H&P: History and physical reviewed and following examination; no interval change.   Smoking Status:  Non-Smoker/Unknown   NPO Status: NPO Appropriate     Plan:   Anes. Type:  General   Pre-Medication: None   Induction:  IV (Standard)   Airway: ETT; Oral   Access/Monitoring: PIV   Maintenance: Balanced     Postop Plan:   Postop Pain: Opioids  Postop Sedation/Airway: Not planned  Disposition: Outpatient     PONV Management:   Adult Risk Factors:, Non-Smoker, Postop Opioids   Prevention: Ondansetron, Dexamethasone     CONSENT: Direct conversation   Plan and risks discussed with: Patient   Blood Products: Consent Deferred (Minimal Blood Loss)                PAC Discussion and Assessment    ASA Classification: 3  Case is suitable for: [place]  Anesthetic techniques and relevant risks discussed: GA  Invasive monitoring and risk discussed: No  Types:   Possibility and Risk of blood transfusion discussed: Yes  NPO instructions given:   Additional anesthetic preparation and risks discussed:   Needs early admission to pre-op area:   Other:     PAC Resident/NP Anesthesia Assessment:  Asa Keith is a 70 year old male scheduled to undergo EXTRACTION, TOOTH #3, 5, 6, 7, 10, 11, 12, 13, 21, 23, 24, 25, 26,  ALVEOLOPLASTY with Dr. Hoffman on 12/1/20. He has the following specific operative considerations:   - RCRI : 0.9% risk of major adverse cardiac event.   - VTE risk: 3-4.5%  - Risk of PONV score = 2.  If > 2, anti-emetic intervention recommended.    --Dental caries with above procedures now planned.   --No history of problems with anesthesia.  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 Followed at [facility] and is stable.   --ITP treated with romiplostim -- stopped 11/2018 after marrow showed possible myeloid neoplasm.   --Concern for platelets with procedure planned. Consult with Dr. Gauthier with above plan for platelet infusion and management prior to procedure. Platelets 38 today. Type and screen drawn today. History of blood transfusions.  --History of stroke 6 six years ago with residual left upper extremity weakness. Some recall issues.  --HLD. Will take simvastatin on DOS. HTN. Will take amlodipine and metoprolol on DOS, but will hold  HCTZ and lisinopril. Documented atrial fibrillation in 4/2020, possibly during acute illness. EKG today NSR. Denies chest pain or irregular HR. Limited activity. Walks short distances.  --Never smoker. Denies pulmonary symptoms. Denies NAE. History of recent pneumonia and sepsis. He was admitted to OSH from 3/30/20-4/3/20, followed by stay in nursing facility and home care. Lung CTA today.   --Chart history of DVT. Patient denies.   --History of JESSICA. Cr today 1.13. Denies history of dialysis.  --Anxiety/depression. Will take bupropion and citalopram on DOS.   --Chart history of RLS, patient denies.   --Denies pain but takes gabapentin TWICE DAILY.  --Type and screen drawn today.         Patient was discussed with Dr Curry.      Reviewed and Signed by PAC Mid-Level Provider/Resident  Mid-Level Provider/Resident: NITESH Alcantara, HETAL  Date: 11/20/20  Time: 10:06am    Attending  Anesthesiologist Anesthesia Assessment:        Anesthesiologist:   Date:   Time:   Pass/Fail:   Disposition:     PAC Pharmacist Assessment:        Pharmacist:   Date:   Time:    Isabel Bentley, NITESH CNS

## 2020-11-21 LAB
ABO + RH BLD: NORMAL
ABO + RH BLD: NORMAL
BLD GP AB SCN SERPL QL: NORMAL
BLOOD BANK CMNT PATIENT-IMP: NORMAL
BLOOD BANK CMNT PATIENT-IMP: NORMAL
SPECIMEN EXP DATE BLD: NORMAL

## 2020-11-23 LAB
COPATH REPORT: NORMAL
COPATH REPORT: NORMAL

## 2020-11-27 DIAGNOSIS — Z11.59 ENCOUNTER FOR SCREENING FOR OTHER VIRAL DISEASES: ICD-10-CM

## 2020-11-27 PROCEDURE — U0003 INFECTIOUS AGENT DETECTION BY NUCLEIC ACID (DNA OR RNA); SEVERE ACUTE RESPIRATORY SYNDROME CORONAVIRUS 2 (SARS-COV-2) (CORONAVIRUS DISEASE [COVID-19]), AMPLIFIED PROBE TECHNIQUE, MAKING USE OF HIGH THROUGHPUT TECHNOLOGIES AS DESCRIBED BY CMS-2020-01-R: HCPCS | Performed by: DENTIST

## 2020-11-28 LAB
SARS-COV-2 RNA SPEC QL NAA+PROBE: NOT DETECTED
SPECIMEN SOURCE: NORMAL

## 2020-12-01 ENCOUNTER — INFUSION THERAPY VISIT (OUTPATIENT)
Dept: ONCOLOGY | Facility: CLINIC | Age: 70
End: 2020-12-01
Attending: INTERNAL MEDICINE
Payer: COMMERCIAL

## 2020-12-01 ENCOUNTER — ANESTHESIA (OUTPATIENT)
Dept: SURGERY | Facility: CLINIC | Age: 70
End: 2020-12-01
Payer: COMMERCIAL

## 2020-12-01 ENCOUNTER — HOSPITAL ENCOUNTER (OUTPATIENT)
Facility: CLINIC | Age: 70
Discharge: HOME | End: 2020-12-01
Attending: DENTIST | Admitting: DENTIST
Payer: COMMERCIAL

## 2020-12-01 VITALS
SYSTOLIC BLOOD PRESSURE: 110 MMHG | HEART RATE: 60 BPM | RESPIRATION RATE: 16 BRPM | DIASTOLIC BLOOD PRESSURE: 70 MMHG | OXYGEN SATURATION: 96 % | WEIGHT: 187.2 LBS | BODY MASS INDEX: 28.46 KG/M2 | TEMPERATURE: 97.2 F

## 2020-12-01 VITALS
DIASTOLIC BLOOD PRESSURE: 85 MMHG | BODY MASS INDEX: 28.4 KG/M2 | RESPIRATION RATE: 14 BRPM | OXYGEN SATURATION: 94 % | SYSTOLIC BLOOD PRESSURE: 137 MMHG | HEART RATE: 67 BPM | TEMPERATURE: 96.3 F | HEIGHT: 68 IN | WEIGHT: 187.39 LBS

## 2020-12-01 VITALS
HEART RATE: 60 BPM | OXYGEN SATURATION: 95 % | SYSTOLIC BLOOD PRESSURE: 107 MMHG | DIASTOLIC BLOOD PRESSURE: 75 MMHG | TEMPERATURE: 98.2 F | RESPIRATION RATE: 16 BRPM

## 2020-12-01 DIAGNOSIS — K02.9 CARIES: ICD-10-CM

## 2020-12-01 DIAGNOSIS — C93.10 CHRONIC MYELOMONOCYTIC LEUKEMIA NOT HAVING ACHIEVED REMISSION (H): Primary | ICD-10-CM

## 2020-12-01 LAB
BLD PROD TYP BPU: NORMAL
INR PPP: 1.54 (ref 0.86–1.14)
NUM BPU REQUESTED: 2
PLATELET # BLD AUTO: 55 10E9/L (ref 150–450)
PLATELET # BLD AUTO: 57 10E9/L (ref 150–450)

## 2020-12-01 PROCEDURE — 85049 AUTOMATED PLATELET COUNT: CPT | Performed by: INTERNAL MEDICINE

## 2020-12-01 PROCEDURE — 272N000001 HC OR GENERAL SUPPLY STERILE: Performed by: DENTIST

## 2020-12-01 PROCEDURE — 85610 PROTHROMBIN TIME: CPT | Performed by: CLINICAL NURSE SPECIALIST

## 2020-12-01 PROCEDURE — 761N000007 HC RECOVERY PHASE 2 EACH 15 MINS: Performed by: DENTIST

## 2020-12-01 PROCEDURE — 370N000002 HC ANESTHESIA TECHNICAL FEE, EACH ADDTL 15 MIN: Performed by: DENTIST

## 2020-12-01 PROCEDURE — 36430 TRANSFUSION BLD/BLD COMPNT: CPT

## 2020-12-01 PROCEDURE — 258N000003 HC RX IP 258 OP 636: Performed by: NURSE ANESTHETIST, CERTIFIED REGISTERED

## 2020-12-01 PROCEDURE — 250N000011 HC RX IP 250 OP 636: Performed by: NURSE ANESTHETIST, CERTIFIED REGISTERED

## 2020-12-01 PROCEDURE — 85049 AUTOMATED PLATELET COUNT: CPT | Performed by: CLINICAL NURSE SPECIALIST

## 2020-12-01 PROCEDURE — P9037 PLATE PHERES LEUKOREDU IRRAD: HCPCS | Performed by: INTERNAL MEDICINE

## 2020-12-01 PROCEDURE — 999N000139 HC STATISTIC PRE-PROCEDURE ASSESSMENT II: Performed by: DENTIST

## 2020-12-01 PROCEDURE — 85025 COMPLETE CBC W/AUTO DIFF WBC: CPT | Performed by: INTERNAL MEDICINE

## 2020-12-01 PROCEDURE — 250N000011 HC RX IP 250 OP 636: Performed by: DENTIST

## 2020-12-01 PROCEDURE — 250N000009 HC RX 250: Performed by: DENTIST

## 2020-12-01 PROCEDURE — 761N000003 HC RECOVERY PHASE 1 LEVEL 2 FIRST HR: Performed by: DENTIST

## 2020-12-01 PROCEDURE — 250N000011 HC RX IP 250 OP 636: Performed by: ANESTHESIOLOGY

## 2020-12-01 PROCEDURE — 360N000022 HC SURGERY LEVEL 3 1ST 30 MIN - UMMC: Performed by: DENTIST

## 2020-12-01 PROCEDURE — 360N000023 HC SURGERY LEVEL 3 EA 15 ADDTL MIN UMMC: Performed by: DENTIST

## 2020-12-01 PROCEDURE — 250N000003 HC SEVOFLURANE, EA 15 MIN: Performed by: DENTIST

## 2020-12-01 PROCEDURE — 250N000013 HC RX MED GY IP 250 OP 250 PS 637: Performed by: DENTIST

## 2020-12-01 PROCEDURE — 250N000011 HC RX IP 250 OP 636: Performed by: INTERNAL MEDICINE

## 2020-12-01 PROCEDURE — 370N000001 HC ANESTHESIA TECHNICAL FEE, 1ST 30 MIN: Performed by: DENTIST

## 2020-12-01 PROCEDURE — 250N000009 HC RX 250: Performed by: NURSE ANESTHETIST, CERTIFIED REGISTERED

## 2020-12-01 RX ORDER — NALOXONE HYDROCHLORIDE 0.4 MG/ML
0.4 INJECTION, SOLUTION INTRAMUSCULAR; INTRAVENOUS; SUBCUTANEOUS
Status: DISCONTINUED | OUTPATIENT
Start: 2020-12-01 | End: 2020-12-01 | Stop reason: HOSPADM

## 2020-12-01 RX ORDER — SODIUM CHLORIDE, SODIUM LACTATE, POTASSIUM CHLORIDE, CALCIUM CHLORIDE 600; 310; 30; 20 MG/100ML; MG/100ML; MG/100ML; MG/100ML
INJECTION, SOLUTION INTRAVENOUS CONTINUOUS
Status: DISCONTINUED | OUTPATIENT
Start: 2020-12-01 | End: 2020-12-01 | Stop reason: HOSPADM

## 2020-12-01 RX ORDER — CEFAZOLIN SODIUM 2 G/100ML
2 INJECTION, SOLUTION INTRAVENOUS
Status: COMPLETED | OUTPATIENT
Start: 2020-12-01 | End: 2020-12-01

## 2020-12-01 RX ORDER — LIDOCAINE 40 MG/G
CREAM TOPICAL
Status: DISCONTINUED | OUTPATIENT
Start: 2020-12-01 | End: 2020-12-01 | Stop reason: HOSPADM

## 2020-12-01 RX ORDER — ONDANSETRON 2 MG/ML
INJECTION INTRAMUSCULAR; INTRAVENOUS PRN
Status: DISCONTINUED | OUTPATIENT
Start: 2020-12-01 | End: 2020-12-01

## 2020-12-01 RX ORDER — EPHEDRINE SULFATE 50 MG/ML
INJECTION, SOLUTION INTRAMUSCULAR; INTRAVENOUS; SUBCUTANEOUS PRN
Status: DISCONTINUED | OUTPATIENT
Start: 2020-12-01 | End: 2020-12-01

## 2020-12-01 RX ORDER — HEPARIN SODIUM (PORCINE) LOCK FLUSH IV SOLN 100 UNIT/ML 100 UNIT/ML
5 SOLUTION INTRAVENOUS
Status: COMPLETED | OUTPATIENT
Start: 2020-12-01 | End: 2020-12-01

## 2020-12-01 RX ORDER — FENTANYL CITRATE 50 UG/ML
25-50 INJECTION, SOLUTION INTRAMUSCULAR; INTRAVENOUS
Status: DISCONTINUED | OUTPATIENT
Start: 2020-12-01 | End: 2020-12-01 | Stop reason: HOSPADM

## 2020-12-01 RX ORDER — SODIUM CHLORIDE, SODIUM LACTATE, POTASSIUM CHLORIDE, CALCIUM CHLORIDE 600; 310; 30; 20 MG/100ML; MG/100ML; MG/100ML; MG/100ML
INJECTION, SOLUTION INTRAVENOUS CONTINUOUS PRN
Status: DISCONTINUED | OUTPATIENT
Start: 2020-12-01 | End: 2020-12-01

## 2020-12-01 RX ORDER — BUPIVACAINE HYDROCHLORIDE AND EPINEPHRINE 5; 5 MG/ML; UG/ML
INJECTION, SOLUTION PERINEURAL PRN
Status: DISCONTINUED | OUTPATIENT
Start: 2020-12-01 | End: 2020-12-01 | Stop reason: HOSPADM

## 2020-12-01 RX ORDER — HYDROMORPHONE HYDROCHLORIDE 1 MG/ML
.3-.5 INJECTION, SOLUTION INTRAMUSCULAR; INTRAVENOUS; SUBCUTANEOUS EVERY 10 MIN PRN
Status: DISCONTINUED | OUTPATIENT
Start: 2020-12-01 | End: 2020-12-01 | Stop reason: HOSPADM

## 2020-12-01 RX ORDER — HEPARIN SODIUM,PORCINE 10 UNIT/ML
5-10 VIAL (ML) INTRAVENOUS
Status: DISCONTINUED | OUTPATIENT
Start: 2020-12-01 | End: 2020-12-01 | Stop reason: HOSPADM

## 2020-12-01 RX ORDER — ONDANSETRON 4 MG/1
4 TABLET, ORALLY DISINTEGRATING ORAL EVERY 30 MIN PRN
Status: DISCONTINUED | OUTPATIENT
Start: 2020-12-01 | End: 2020-12-01 | Stop reason: HOSPADM

## 2020-12-01 RX ORDER — LIDOCAINE HYDROCHLORIDE 20 MG/ML
INJECTION, SOLUTION INFILTRATION; PERINEURAL PRN
Status: DISCONTINUED | OUTPATIENT
Start: 2020-12-01 | End: 2020-12-01

## 2020-12-01 RX ORDER — DEXAMETHASONE SODIUM PHOSPHATE 4 MG/ML
INJECTION, SOLUTION INTRA-ARTICULAR; INTRALESIONAL; INTRAMUSCULAR; INTRAVENOUS; SOFT TISSUE PRN
Status: DISCONTINUED | OUTPATIENT
Start: 2020-12-01 | End: 2020-12-01

## 2020-12-01 RX ORDER — FENTANYL CITRATE 50 UG/ML
INJECTION, SOLUTION INTRAMUSCULAR; INTRAVENOUS PRN
Status: DISCONTINUED | OUTPATIENT
Start: 2020-12-01 | End: 2020-12-01

## 2020-12-01 RX ORDER — PROPOFOL 10 MG/ML
INJECTION, EMULSION INTRAVENOUS PRN
Status: DISCONTINUED | OUTPATIENT
Start: 2020-12-01 | End: 2020-12-01

## 2020-12-01 RX ORDER — CHLORHEXIDINE GLUCONATE ORAL RINSE 1.2 MG/ML
10 SOLUTION DENTAL ONCE
Status: COMPLETED | OUTPATIENT
Start: 2020-12-01 | End: 2020-12-01

## 2020-12-01 RX ORDER — HEPARIN SODIUM (PORCINE) LOCK FLUSH IV SOLN 100 UNIT/ML 100 UNIT/ML
5 SOLUTION INTRAVENOUS
Status: DISCONTINUED | OUTPATIENT
Start: 2020-12-01 | End: 2020-12-01 | Stop reason: HOSPADM

## 2020-12-01 RX ORDER — OXYCODONE HYDROCHLORIDE 5 MG/1
5 TABLET ORAL EVERY 6 HOURS PRN
Qty: 15 TABLET | Refills: 0 | Status: SHIPPED | OUTPATIENT
Start: 2020-12-01 | End: 2020-12-01

## 2020-12-01 RX ORDER — OXYCODONE HYDROCHLORIDE 5 MG/1
5 TABLET ORAL EVERY 6 HOURS PRN
Qty: 15 TABLET | Refills: 0 | Status: SHIPPED | OUTPATIENT
Start: 2020-12-01 | End: 2020-12-05

## 2020-12-01 RX ORDER — CEFAZOLIN SODIUM 1 G/3ML
1 INJECTION, POWDER, FOR SOLUTION INTRAMUSCULAR; INTRAVENOUS SEE ADMIN INSTRUCTIONS
Status: DISCONTINUED | OUTPATIENT
Start: 2020-12-01 | End: 2020-12-01 | Stop reason: HOSPADM

## 2020-12-01 RX ORDER — ONDANSETRON 2 MG/ML
4 INJECTION INTRAMUSCULAR; INTRAVENOUS EVERY 30 MIN PRN
Status: DISCONTINUED | OUTPATIENT
Start: 2020-12-01 | End: 2020-12-01 | Stop reason: HOSPADM

## 2020-12-01 RX ORDER — HEPARIN SODIUM,PORCINE 10 UNIT/ML
5-10 VIAL (ML) INTRAVENOUS EVERY 24 HOURS
Status: DISCONTINUED | OUTPATIENT
Start: 2020-12-01 | End: 2020-12-01 | Stop reason: HOSPADM

## 2020-12-01 RX ORDER — OXYCODONE HYDROCHLORIDE 5 MG/1
5 TABLET ORAL EVERY 4 HOURS PRN
Status: DISCONTINUED | OUTPATIENT
Start: 2020-12-01 | End: 2020-12-01 | Stop reason: HOSPADM

## 2020-12-01 RX ORDER — NALOXONE HYDROCHLORIDE 0.4 MG/ML
0.2 INJECTION, SOLUTION INTRAMUSCULAR; INTRAVENOUS; SUBCUTANEOUS
Status: DISCONTINUED | OUTPATIENT
Start: 2020-12-01 | End: 2020-12-01 | Stop reason: HOSPADM

## 2020-12-01 RX ORDER — MEPERIDINE HYDROCHLORIDE 25 MG/ML
12.5 INJECTION INTRAMUSCULAR; INTRAVENOUS; SUBCUTANEOUS
Status: DISCONTINUED | OUTPATIENT
Start: 2020-12-01 | End: 2020-12-01 | Stop reason: HOSPADM

## 2020-12-01 RX ORDER — CHLORHEXIDINE GLUCONATE ORAL RINSE 1.2 MG/ML
SOLUTION DENTAL
Qty: 473 ML | Refills: 0 | Status: SHIPPED | OUTPATIENT
Start: 2020-12-01

## 2020-12-01 RX ADMIN — DEXAMETHASONE SODIUM PHOSPHATE 4 MG: 4 INJECTION, SOLUTION INTRA-ARTICULAR; INTRALESIONAL; INTRAMUSCULAR; INTRAVENOUS; SOFT TISSUE at 12:54

## 2020-12-01 RX ADMIN — LIDOCAINE HYDROCHLORIDE 80 MG: 20 INJECTION, SOLUTION INFILTRATION; PERINEURAL at 12:39

## 2020-12-01 RX ADMIN — SODIUM CHLORIDE, POTASSIUM CHLORIDE, SODIUM LACTATE AND CALCIUM CHLORIDE: 600; 310; 30; 20 INJECTION, SOLUTION INTRAVENOUS at 12:34

## 2020-12-01 RX ADMIN — CHLORHEXIDINE GLUCONATE 0.12% ORAL RINSE 10 ML: 1.2 LIQUID ORAL at 12:12

## 2020-12-01 RX ADMIN — PHENYLEPHRINE HYDROCHLORIDE 100 MCG: 10 INJECTION INTRAVENOUS at 12:39

## 2020-12-01 RX ADMIN — FENTANYL CITRATE 100 MCG: 50 INJECTION, SOLUTION INTRAMUSCULAR; INTRAVENOUS at 12:39

## 2020-12-01 RX ADMIN — PROPOFOL 40 MG: 10 INJECTION, EMULSION INTRAVENOUS at 12:43

## 2020-12-01 RX ADMIN — SODIUM CHLORIDE, PRESERVATIVE FREE 5 ML: 5 INJECTION INTRAVENOUS at 16:48

## 2020-12-01 RX ADMIN — CEFAZOLIN 2 G: 10 INJECTION, POWDER, FOR SOLUTION INTRAVENOUS at 12:50

## 2020-12-01 RX ADMIN — PROPOFOL 100 MG: 10 INJECTION, EMULSION INTRAVENOUS at 12:39

## 2020-12-01 RX ADMIN — Medication 5 MG: at 12:39

## 2020-12-01 RX ADMIN — ROCURONIUM BROMIDE 40 MG: 10 INJECTION INTRAVENOUS at 12:40

## 2020-12-01 RX ADMIN — ONDANSETRON 4 MG: 2 INJECTION INTRAMUSCULAR; INTRAVENOUS at 14:41

## 2020-12-01 RX ADMIN — Medication 5 MG: at 13:19

## 2020-12-01 RX ADMIN — Medication 5 ML: at 10:06

## 2020-12-01 RX ADMIN — PROPOFOL 40 MG: 10 INJECTION, EMULSION INTRAVENOUS at 12:40

## 2020-12-01 RX ADMIN — Medication 5 MG: at 13:21

## 2020-12-01 RX ADMIN — ONDANSETRON 4 MG: 2 INJECTION INTRAMUSCULAR; INTRAVENOUS at 13:13

## 2020-12-01 ASSESSMENT — MIFFLIN-ST. JEOR: SCORE: 1579.5

## 2020-12-01 ASSESSMENT — PAIN SCALES - GENERAL: PAINLEVEL: NO PAIN (0)

## 2020-12-01 NOTE — PATIENT INSTRUCTIONS
[facility] Main Line: [phone]    Call triage nurse with chills and/or temperature greater than or equal to 100.4, uncontrolled nausea/vomiting, diarrhea, constipation, dizziness, shortness of breath, chest pain, bleeding, unexplained bruising, or any new/concerning symptoms, questions/concerns.   If you are having any concerning symptoms or wish to speak to a provider before your next infusion visit, please call your care coordinator or triage to notify them so we can adequately serve you.   Nurse Triage line:  [phone]    If after hours, weekends, or holidays, call main hospital  and ask for Oncology doctor on call @ [phone]      December 2020 Sunday Monday Tuesday Wednesday Thursday Friday Saturday             1    LAB PERIPHERAL   6:45 AM   (15 min.)   [facility] LAB DRAW   [facility] ONC INFUSION 180   7:00 AM   (180 min.)    ONCOLOGY INFUSION   [facility]    SURGICAL EXTRACTION, TOOTH  11:55 AM   Elizabeth Casanova DDS   [facility] OR 2     3     4     5       6     7     8     9     10     11     12       13     14     15     16     17     18     19       20     21     22     23     24     25     26       27     28     29     30     31                           January 2021 Sunday Monday Tuesday Wednesday Thursday Friday Saturday                            1     2       3     4     5     6     7     8     9       10     11     12     13     14     15     16       17     18     19     20     21     22     23       24     25     26     27     28     29     30       31                                                   Lab Results:  Recent Results (from the past 12 hour(s))   Blood component    Collection Time: 12/01/20  7:00 AM   Result Value Ref Range    Unit Number [id number]     Blood Component Type PlateletPheresis,LeukoRed Irrad (Part 2)     Division Number 00     Status of Unit Released to care unit  12/01/2020 0754     Blood Product Code [id number]     Unit Status ISS    Blood component    Collection Time: 12/01/20  7:00 AM   Result Value Ref Range    Unit Number [id number]     Blood Component Type PlateletPheresis LeukoReduced Irradiated     Division Number 00     Status of Unit Ready for patient 12/01/2020 0724     Blood Product Code [id number]     Unit Status ALPA    CBC with platelets differential    Collection Time: 12/01/20  7:22 AM   Result Value Ref Range    WBC 14.4 (H) 4.0 - 11.0 10e9/L    RBC Count 4.52 4.4 - 5.9 10e12/L    Hemoglobin 13.3 13.3 - 17.7 g/dL    Hematocrit 42.3 40.0 - 53.0 %    MCV 94 78 - 100 fl    MCH 29.4 26.5 - 33.0 pg    MCHC 31.4 (L) 31.5 - 36.5 g/dL    RDW 18.5 (H) 10.0 - 15.0 %    Platelet Count 40 (LL) 150 - 450 10e9/L    Diff Method Manual Differential     % Neutrophils 75.4 %    % Lymphocytes 7.9 %    % Monocytes 10.5 %    % Eosinophils 0.9 %    % Basophils 0.9 %    % Myelocytes 2.6 %    % Promyelocytes 0.9 %    % Other Cells 0.9 %    Absolute Neutrophil 10.9 (H) 1.6 - 8.3 10e9/L    Absolute Lymphocytes 1.1 0.8 - 5.3 10e9/L    Absolute Monocytes 1.5 (H) 0.0 - 1.3 10e9/L    Absolute Eosinophils 0.1 0.0 - 0.7 10e9/L    Absolute Basophils 0.1 0.0 - 0.2 10e9/L    Absolute Myelocytes 0.4 (H) 0 10e9/L    Absolute Promyeloctyes 0.1 (H) 0 10e9/L    Absolute Other Cells 0.1 (H) 0 10e9/L    Anisocytosis Slight     Poikilocytosis Slight     Ovalocytes Slight     Platelet Estimate Confirming automated cell count

## 2020-12-01 NOTE — ANESTHESIA POSTPROCEDURE EVALUATION
Anesthesia POST Procedure Evaluation    Patient: Asa Keith   MRN:     [id number] Gender:   male   Age:    70 year old :      1950        Preoperative Diagnosis: Caries [K02.9]   Procedure(s):  EXTRACTION, TOOTH #3, 5, 6, 7, 10, 11, 13, 21, 23, 24, 25, 26   Postop Comments: No value filed.     Anesthesia Type: General       Disposition: Outpatient   Postop Pain Control: Uneventful            Sign Out: Well controlled pain   PONV: No   Neuro/Psych: Uneventful            Sign Out: Acceptable/Baseline neuro status   Airway/Respiratory: Uneventful            Sign Out: Acceptable/Baseline resp. status   CV/Hemodynamics: Uneventful            Sign Out: Acceptable CV status   Other NRE: NONE   DID A NON-ROUTINE EVENT OCCUR? No    Event details/Postop Comments:  I assessed the patient in PACU prior to discharge.  The patient was awake and alert with minimal to moderate pain which was well controlled with medications.  The patient denied any significant nausea.  The patient's heart rate and blood pressure with consistent with his preoperative measurements, and he was breathing comfortably without any signs of respiratory distress.  There were no readily apparent anesthetic complications.  All his questions were answered.         Last Anesthesia Record Vitals:  CRNA VITALS  2020 1313 - 2020 1413      2020             Resp Rate (observed):  (!) 1          Last PACU Vitals:  Vitals Value Taken Time   /80 20 1450   Temp 36.6  C (97.9  F) 20 1445   Pulse 64 20 1454   Resp 12 20 1445   SpO2 94 % 20 1454   Temp src     NIBP     Pulse     SpO2     Resp     Temp     Ht Rate     Temp 2     Vitals shown include unvalidated device data.      Electronically Signed By: Mariano Canela MD, 2020, 2:55 PM

## 2020-12-01 NOTE — PROGRESS NOTES
Infusion Nursing Note:  sAa Keith presents today for 2 units Platelets.    Patient seen by provider today: No   present during visit today: Not Applicable.    Note: Patient reported to clinic today with no new complaints or concerns.  Post platelet count 55    Intravenous Access:  Peripheral IV placed.    Treatment Conditions:  Lab Results   Component Value Date    HGB 13.3 12/01/2020     Lab Results   Component Value Date    WBC 14.4 12/01/2020      Lab Results   Component Value Date    ANEU 10.9 12/01/2020     Lab Results   Component Value Date    PLT 40 12/01/2020      Lab Results   Component Value Date     11/20/2020                   Lab Results   Component Value Date    POTASSIUM 3.4 11/20/2020           No results found for: MAG         Lab Results   Component Value Date    CR 1.13 11/20/2020                   Lab Results   Component Value Date    BARRETT 9.1 11/20/2020                Lab Results   Component Value Date    BILITOTAL 0.9 11/20/2020           Lab Results   Component Value Date    ALBUMIN 4.2 11/20/2020                    Lab Results   Component Value Date    ALT 25 11/20/2020           Lab Results   Component Value Date    AST 33 11/20/2020       Results reviewed, labs MET treatment parameters, ok to proceed with treatment.  Blood transfusion consent signed 12/1/2020 with Dr Gauthier by phone.      Post Infusion Assessment:  Patient tolerated infusion without incident.  Blood return noted pre and post infusion.  Site patent and intact, free from redness, edema or discomfort.  No evidence of extravasations.  Access discontinued per protocol.       Discharge Plan:   Patient declined prescription refills.  Discharge instructions reviewed with: Patient.  Patient and/or family verbalized understanding of discharge instructions and all questions answered.  Copy of AVS reviewed with patient and/or family.    Patient discharged in stable condition accompanied by: self.  Departure Mode:  Wheelchair.  Face to Face time: 10 minutes.    Thiago Valverde RN

## 2020-12-01 NOTE — OP NOTE
ORAL & MAXILLOFACIAL SURGERY OPERATIVE NOTE:      STAFF SURGEON:   Elizabeth Casanova DDS      RESIDENT SURGEON:   ADELAIDA Chirinos DDS      PREOPERATIVE DIAGNOSIS:   1. Caries on teeth #3, 5, 6, 7, 10, 11, 13, 21, 23, 24, 25, 26      POSTOPERATIVE DIAGNOSIS:    Same      PROCEDURES PERFORMED:   1. Extraction of teeth #21, 22, 23, 24, 25, 26, 27, 28 (all teeth)  2. Alveoloplasty of lower right and lower left quadrants      BLOOD LOSS:   30cc      ANESTHESIA:    General anesthesia with oral tracheal intubation with local anesthesia via 10ml of 0.5% marcaine with 1:200,000 epinephrine via bilateral maxillary buccal and palatal infiltration, mandibular ANCA/long buccal/lingual nerve blocks      COMPLICATIONS:    None      DRAINS:    None      IMPLANTS:   None      SPECIMENS:   Teeth #3, 5, 6, 7, 10, 11, 13, 21, 23, 24, 25, 26 for disposal      INDICATIONS FOR OPERATION:   Asa a patient with history of CMML, Large B cell lymphoma, and thrombocytopenia who was [place] [facility] [place] of carious teeth #3, 5, 6, 7, 10, 11, 13, 21, 23, 24, 25, 26 in preparation for a maxillary denture and mandibular partial denture. [facility] [place]. After evaluation of the patient clinically as well as radiographically, it was recommended to the patient that the patient to be taken to the operating room under general anesthesia for extraction of teeth due to bleeding risk, pre-op platelet transfusions and possible intraop transfusion . The risks and benefits of the procedure were extensively discussed with the patient/parents or guardian including but not limited to, bleeding, bruising, postoperative pain, infection, damage to the inferior alveolar nerve, damage to adjacent structures, including but not limited to, mandibular alveolar bone maxillary alveolar bone, as well as failure of bone to heal, and need for additional procedures in the future. After a thorough discussion of the risks  and benefits, the patient/parent or guardian expressed understanding and consented to the procedure.       DESCRIPTION OF PROCEDURE:    The patient was met in the preoperative holding area on 12/1/2020.  Again, the risks and benefits were discussed with the patient and signed written and verbal consent was obtained from the patient, parent and/or guardian. The patient was then taken to the operating by the anesthesia service. The patient was placed in supine position.  The patient was appropriately padded.  All standard ASA monitors were applied.  The patient then was then induced by the Anesthesia service and a secure airway was placed without complication.Then a time-out was conducted and then the patient was prepped, oropharyngeal throat pack placed, oral cavity cleansed with chlorhexidine rinse, and local anesthesia administered as previously mentioned. Surgeons left to scrub and returned to don sterile gown and gloves. The patient was draped in standard fashion. A second formal time-out was conducted per [facility] protocols.            Teeth #3, 5, 6, 7, 10, 11, 13, 21, 23, 24, 25, 26:  #9 elevator used to release gingival attachment.  Teeth #3, 5, 6, 7, 10, 11, 13, 21, 23, 24, 25, 26 were elevated and luxated, then retrieved with Yves forceps. Immediate denture fabricated by outside dentist was placed, but did non seat properly. Due to pt's bleeding risk, alveoloplasty was not performed and the denture was removed. The sockets were curetted and irrigated with sterile saline. Sockets packed with gelfoam wrapped in Surgicel. Closure with 3-0 chromic gut sutures in figure-8 fashion     The oral cavity was then irrigated, suctioned and the oropharyngeal throat pack was removed. The patients face was cleaned with wet gauze. Two ghost style oral packs were placed, bilaterally to aid in hemostasis.        The patient was then turned back over to the anesthesia service where the patient was  extubated without complications and transferred to the post anesthesia care unit in stable condition. All counts were correct.          Fran Blevins DDS  Oral & Maxillofacial Surgery

## 2020-12-01 NOTE — NURSING NOTE
"Chief Complaint   Patient presents with     Port Draw     labs drawn from port by rn.  vs taken     Port accessed with 20 gauge 3/4\" gripper needle and labs drawn by rn.  Port flushed with NS and heparin.  Pt tolerated well.  VS taken.  Pt checked in for next appt.    Allison Cline RN    "

## 2020-12-01 NOTE — BRIEF OP NOTE
[facility]     Brief Operative Note    Pre-operative diagnosis: Caries [K02.9]  Post-operative diagnosis Same as pre-operative diagnosis    Procedure: Procedure(s):  EXTRACTION, TOOTH #3, 5, 6, 7, 10, 11, 13, 21, 23, 24, 25, 26  Surgeon: Surgeon(s) and Role:     * Elizabeth Casanova DDS - Primary     * Tj Mark - Resident - Assisting  Anesthesia: General   Estimated blood loss: 30 cc  Drains: None  Specimens: * No specimens in log *  Findings:   None.  Complications: None.  Implants: * No implants in log *

## 2020-12-01 NOTE — DISCHARGE INSTRUCTIONS
[facility]Day [facility]   Adult Discharge Orders & Instructions   For 24 hours after surgery    1. Get plenty of rest.  A responsible adult must stay with you for at least 24 hours after you leave the hospital.   2. Do not drive or use heavy equipment.  If you have weakness or tingling, don't drive or use heavy equipment until this feeling goes away.  3. Do not drink alcohol.  4. Avoid strenuous or risky activities.  Ask for help when climbing stairs.   5. You may feel lightheaded.  IF so, sit for a few minutes before standing.  Have someone help you get up.   6. If you have nausea (feel sick to your stomach): Drink only clear liquids such as apple juice, ginger ale, broth or 7-Up.  Rest may also help.  Be sure to drink enough fluids.  Move to a regular diet as you feel able.  7. You may have a slight fever. Call the doctor if your fever is over 100 F (37.7 C) (taken under the tongue) or lasts longer than 24 hours.  8. You may have a dry mouth, a sore throat, muscle aches or trouble sleeping.  These should go away after 24 hours.  9. Do not make important or legal decisions.   Call your doctor for any of the followin.  Signs of infection (fever, growing tenderness at the surgery site, a large amount of drainage or bleeding, severe pain, foul-smelling drainage, redness, swelling).    2. It has been over 8 to 10 hours since surgery and you are still not able to urinate (pass water).    3.  Headache for over 24 hours.    To contact a doctor, call Dr. Casanova's clinic at [phone]:     X [phone] and ask for the resident on call for Oral surgery (answered 24 hours a day)   X Emergency Department: [facility]: [phone]       (TTY for hearing impaired: [phone])

## 2020-12-01 NOTE — ANESTHESIA PROCEDURE NOTES
Airway   Date/Time: 12/1/2020 12:43 PM   Patient location during procedure: OR    Staff -   Anesthesiologist:  Mariano Canela MD  Resident/Fellow: Cadence Jhaveri  CRNA: Shelby Hogue APRN CRNA  Performed By: ENRIQUE    Indications and Patient Condition  Indications for airway management: you-procedural  Induction type:intravenousMask difficulty assessment: 2 - vent by mask + OA or adjuvant +/- NMBA    Final Airway Details  Final airway type: endotracheal airway  Successful airway:ETT - single  Endotracheal Airway Details   ETT size (mm): 7.5  Cuffed: yes  Cuff volume (mL): 10  Successful intubation technique: direct laryngoscopy  Grade View of Cords: 1  Adjucts: stylet  Measured from: lips  Secured at (cm): 22  Secured with: pink tape  Bite block used: None    Post intubation assessment   Placement verified by: capnometry, equal breath sounds and chest rise   Number of attempts at approach: 1  Secured with:pink tape  Ease of procedure: easy  Dentition: Intact and Unchanged

## 2020-12-01 NOTE — ANESTHESIA CARE TRANSFER NOTE
Patient: Asa Keith    Procedure(s):  EXTRACTION, TOOTH #3, 5, 6, 7, 10, 11, 13, 21, 23, 24, 25, 26    Diagnosis: Caries [K02.9]  Diagnosis Additional Information: No value filed.    Anesthesia Type:   General     Note:  Airway :Nasal Cannula  Patient transferred to:PACU  Handoff Report: Identifed the Patient, Identified the Reponsible Provider, Reviewed the pertinent medical history, Discussed the surgical course, Reviewed Intra-OP anesthesia mangement and issues during anesthesia, Set expectations for post-procedure period and Allowed opportunity for questions and acknowledgement of understanding      Vitals: (Last set prior to Anesthesia Care Transfer)    CRNA VITALS  12/1/2020 1313 - 12/1/2020 1349      12/1/2020             Resp Rate (observed):  (!) 1                Electronically Signed By: NITESH Hall CRNA  December 1, 2020  1:49 PM

## 2020-12-02 LAB
ANISOCYTOSIS BLD QL SMEAR: SLIGHT
BASOPHILS # BLD AUTO: 0.1 10E9/L (ref 0–0.2)
BASOPHILS NFR BLD AUTO: 0.9 %
BLD PROD TYP BPU: NORMAL
BLD PROD TYP BPU: NORMAL
BLD UNIT ID BPU: 0
BLD UNIT ID BPU: 0
BLOOD PRODUCT CODE: NORMAL
BLOOD PRODUCT CODE: NORMAL
BPU ID: NORMAL
BPU ID: NORMAL
DIFFERENTIAL METHOD BLD: ABNORMAL
EOSINOPHIL # BLD AUTO: 0.1 10E9/L (ref 0–0.7)
EOSINOPHIL NFR BLD AUTO: 0.9 %
ERYTHROCYTE [DISTWIDTH] IN BLOOD BY AUTOMATED COUNT: 18.5 % (ref 10–15)
HCT VFR BLD AUTO: 42.3 % (ref 40–53)
HGB BLD-MCNC: 13.3 G/DL (ref 13.3–17.7)
LYMPHOCYTES # BLD AUTO: 1.1 10E9/L (ref 0.8–5.3)
LYMPHOCYTES NFR BLD AUTO: 7.9 %
MCH RBC QN AUTO: 29.4 PG (ref 26.5–33)
MCHC RBC AUTO-ENTMCNC: 31.4 G/DL (ref 31.5–36.5)
MCV RBC AUTO: 94 FL (ref 78–100)
MONOCYTES # BLD AUTO: 1.5 10E9/L (ref 0–1.3)
MONOCYTES NFR BLD AUTO: 10.5 %
MYELOCYTES # BLD: 0.4 10E9/L
MYELOCYTES NFR BLD MANUAL: 2.6 %
NEUTROPHILS # BLD AUTO: 10.9 10E9/L (ref 1.6–8.3)
NEUTROPHILS NFR BLD AUTO: 75.4 %
OTHER CELLS # BLD MANUAL: 0.1 10E9/L
OTHER CELLS NFR BLD MANUAL: 0.9 %
OVALOCYTES BLD QL SMEAR: SLIGHT
PLATELET # BLD AUTO: 40 10E9/L (ref 150–450)
PLATELET # BLD EST: ABNORMAL 10*3/UL
POIKILOCYTOSIS BLD QL SMEAR: SLIGHT
PROMYELOCYTES # BLD MANUAL: 0.1 10E9/L
PROMYELOCYTES NFR BLD MANUAL: 0.9 %
RBC # BLD AUTO: 4.52 10E12/L (ref 4.4–5.9)
TRANSFUSION STATUS PATIENT QL: NORMAL
WBC # BLD AUTO: 14.4 10E9/L (ref 4–11)

## 2021-06-02 ENCOUNTER — RECORDS - HEALTHEAST (OUTPATIENT)
Dept: ADMINISTRATIVE | Facility: CLINIC | Age: 71
End: 2021-06-02

## 2023-07-25 NOTE — PROGRESS NOTES
Problem: Adult Inpatient Plan of Care  Goal: Plan of Care Review  Outcome: Ongoing, Progressing  Goal: Patient-Specific Goal (Individualized)  Outcome: Ongoing, Progressing  Goal: Absence of Hospital-Acquired Illness or Injury  Outcome: Ongoing, Progressing  Intervention: Identify and Manage Fall Risk  Intervention: Prevent Skin Injury  Intervention: Prevent and Manage VTE (Venous Thromboembolism) Risk  Intervention: Prevent Infection  Goal: Optimal Comfort and Wellbeing  Outcome: Ongoing, Progressing  Intervention: Monitor Pain and Promote Comfort  Intervention: Provide Person-Centered Care  Goal: Readiness for Transition of Care  Outcome: Ongoing, Progressing   Goal Outcome Evaluation:   Patient had no acute events during shift  ETCO2 on 2 L, A& O x4  Pain control 7-9/10 - see MAR abd pain  Dysuria - UA etrasxl -4137                        First of 10 rTMS visits.  66-year-old male with left hemiplegia following stroke in 2011. Lacunar infarct of posterior lentiform nucleus with extension into corona radiata on right.  Cognition and speech intact.  Shows about 60 degrees of active finger flexion and extension.  Limited by weakness and spasticity, not contracture.  Spasticity rates 2 on Lolis scale for left finger flexors and wrist flexors.  He roblero goals to play golf better and to play his guitar again.  Baseline symptoms unremarkable except for too little sleep at times.  His BDI score was 7.  His Box and block score was 15 on left and 54 on right.  His RMT was 90 on left for extensor digitorum. Did not attempt to get average peak to peak amplitude as MEPS were only occasional.  On right RMT was 65.  He received 10 minutes of 6-hz priming rTMS followed by 10 minutes of 1-Hz principal rTMS to left hemisphere at intensity of 59.  Followed by hand exercises and home instruction.  Also assisted him with use of portable estim unit for finger extensors that he owns and will use at home.  All tolerated well.  Asa Soares, PT

## (undated) DEVICE — TOOTHBRUSH ADULT NON STERILE MDS136850

## (undated) DEVICE — STRAP UNIVERSAL POSITIONING 2-PIECE 4X47X76" 91-287

## (undated) DEVICE — BUR STRK SIDE CUT 1.6X5.1X44.8MM CARBIDE 6FLUTE 1607-002-107

## (undated) DEVICE — PACK NEURO MINOR UMMC SNE32MNMU4

## (undated) DEVICE — SOL NACL 0.9% IRRIG 1000ML BOTTLE 2F7124

## (undated) DEVICE — SYR EAR BULB 3OZ 0035830

## (undated) DEVICE — SU CHROMIC 3-0 FS-2 27" 636

## (undated) DEVICE — LINEN TOWEL PACK X5 5464

## (undated) DEVICE — SOL WATER IRRIG 1000ML BOTTLE 2F7114

## (undated) DEVICE — DRSG GAUZE 4X4" TRAY 6939

## (undated) DEVICE — SPONGE SURGIFOAM 100 1974

## (undated) DEVICE — LINEN TOWEL PACK X6 WHITE 5487

## (undated) RX ORDER — CHLORHEXIDINE GLUCONATE ORAL RINSE 1.2 MG/ML
SOLUTION DENTAL
Status: DISPENSED
Start: 2020-12-01

## (undated) RX ORDER — LIDOCAINE HYDROCHLORIDE 20 MG/ML
INJECTION, SOLUTION EPIDURAL; INFILTRATION; INTRACAUDAL; PERINEURAL
Status: DISPENSED
Start: 2020-12-01

## (undated) RX ORDER — ONDANSETRON 2 MG/ML
INJECTION INTRAMUSCULAR; INTRAVENOUS
Status: DISPENSED
Start: 2020-12-01

## (undated) RX ORDER — FENTANYL CITRATE 50 UG/ML
INJECTION, SOLUTION INTRAMUSCULAR; INTRAVENOUS
Status: DISPENSED
Start: 2020-12-01

## (undated) RX ORDER — CEFAZOLIN SODIUM 2 G/100ML
INJECTION, SOLUTION INTRAVENOUS
Status: DISPENSED
Start: 2020-12-01

## (undated) RX ORDER — FENTANYL CITRATE-0.9 % NACL/PF 10 MCG/ML
PLASTIC BAG, INJECTION (ML) INTRAVENOUS
Status: DISPENSED
Start: 2020-12-01

## (undated) RX ORDER — EPHEDRINE SULFATE 50 MG/ML
INJECTION, SOLUTION INTRAMUSCULAR; INTRAVENOUS; SUBCUTANEOUS
Status: DISPENSED
Start: 2020-12-01